# Patient Record
Sex: FEMALE | Race: WHITE | Employment: PART TIME
[De-identification: names, ages, dates, MRNs, and addresses within clinical notes are randomized per-mention and may not be internally consistent; named-entity substitution may affect disease eponyms.]

---

## 2017-01-03 ENCOUNTER — HISTORIC RESULTS (OUTPATIENT)
Dept: ADMINISTRATIVE | Age: 52
End: 2017-01-03

## 2017-01-04 ENCOUNTER — HISTORIC RESULTS (OUTPATIENT)
Dept: ADMINISTRATIVE | Age: 52
End: 2017-01-04

## 2017-03-01 ENCOUNTER — HISTORIC RESULTS (OUTPATIENT)
Dept: ADMINISTRATIVE | Age: 52
End: 2017-03-01

## 2017-05-11 ENCOUNTER — HISTORIC RESULTS (OUTPATIENT)
Dept: ADMINISTRATIVE | Age: 52
End: 2017-05-11

## 2017-11-12 ENCOUNTER — HEALTH MAINTENANCE LETTER (OUTPATIENT)
Age: 52
End: 2017-11-12

## 2018-06-11 ENCOUNTER — HOSPITAL ENCOUNTER (OUTPATIENT)
Dept: GENERAL RADIOLOGY | Facility: OTHER | Age: 53
Discharge: HOME | End: 2018-06-11
Attending: CHIROPRACTOR | Admitting: CHIROPRACTOR
Payer: COMMERCIAL

## 2018-06-11 DIAGNOSIS — M51.379 DISC DEGENERATION, LUMBOSACRAL: ICD-10-CM

## 2018-06-11 DIAGNOSIS — M19.90 ARTHRITIS: ICD-10-CM

## 2018-06-11 PROCEDURE — 72100 X-RAY EXAM L-S SPINE 2/3 VWS: CPT

## 2018-06-20 RX ORDER — IBUPROFEN 800 MG/1
800 TABLET, FILM COATED ORAL
Status: ON HOLD | COMMUNITY
Start: 2016-12-23 | End: 2020-08-28

## 2019-05-07 ENCOUNTER — HOSPITAL ENCOUNTER (OUTPATIENT)
Dept: ULTRASOUND IMAGING | Facility: OTHER | Age: 54
End: 2019-05-07
Attending: RADIOLOGY
Payer: COMMERCIAL

## 2019-05-07 DIAGNOSIS — I83.90 VARICOSE VEINS OF LOWER EXTREMITY: ICD-10-CM

## 2019-05-07 PROCEDURE — G0463 HOSPITAL OUTPT CLINIC VISIT: HCPCS

## 2020-03-02 ENCOUNTER — HEALTH MAINTENANCE LETTER (OUTPATIENT)
Age: 55
End: 2020-03-02

## 2020-06-16 ENCOUNTER — TRANSCRIBE ORDERS (OUTPATIENT)
Dept: OTHER | Age: 55
End: 2020-06-16

## 2020-06-16 DIAGNOSIS — K85.90 PANCREATITIS: Primary | ICD-10-CM

## 2020-06-17 ENCOUNTER — TELEPHONE (OUTPATIENT)
Dept: GASTROENTEROLOGY | Facility: CLINIC | Age: 55
End: 2020-06-17

## 2020-06-17 NOTE — TELEPHONE ENCOUNTER
[facility] Intake form:    Referring provider : Truong Troy   [facility]    Providers RNCC contact -   Name Camryn BOOKER  Phone [phone]   Fax number: [phone]    Requested provider (if specified):      Indication/Diagnosis for consultation: Pancreatis Recurrent    Is diagnosis on list of approved diagnosis: Yes    Has patient been evaluated in clinic or had a procedure [facility] provider in the last 5 years:  No     Has patient been evaluated by another Gastroenterologist? Unknown     Imaging completed:     CT scan     Yes    MRI         Yes    Procedures:     Upper Endoscopy/EGD Yes        Endoscopic Ultrasound/EUS No    ERCP  No    Colonoscopy No      Are images able/being pushed to our system? Yes     Is patient aware of request for clinc consultation and ok to be contacted to schedule? Yes

## 2020-06-18 NOTE — TELEPHONE ENCOUNTER
[facility]     Referring provider:  Truong Troy   [facility]    Referred to: [facility]     Provider Requested:  NA      Referral Received: 6/17/2020     Records received: in CareEverywhere     Images received:   Requested CT and MRI mail from [place]    Evaluation for:   Pancreatis Recurrent  w/ elevated LFTs    Clinical History (per RN review):       See Full note from Dr Troy in CE from 6/16/2020    Given recurrent symptoms of biliary colic despite having cholecystectomy, prior self-limiting elevation in liver enzymes and now acute pancreatitis which was also associated with elevation in liver enzymes, I think she needs further evaluation of her pancreaticobiliary system. She had a recent MRCP which was unremarkable.     I would like to send a referral to the pancreaticobiliary specialists at the [facility] for further evaluation.    She has a history of disaccharidase deficiency and malabsorption. History of diverticulitis. LINX surgery in 2016. Cholecystectomy    CT Abdomen 6/12/2020  IMPRESSION:  1. Development of mild fat stranding centered about the tail of the pancreas  and proximal portion of the descending colon.  Recommend correlation with  laboratory values.  This most likely represents early acute pancreatitis, less  likely diverticulitis.      Liver Enzymes elevated, per [facility]    6/12/2020  ALk Phos 147  AST 93  Lipase 488  ALT 80  CT 6/16/2020- read in [facility]  IMPRESSION:  1. Development of mild fat stranding centered about the tail of the pancreas  and proximal portion of the descending colon.  Recommend correlation with  laboratory values.  This most likely represents early acute pancreatitis, less  likely diverticulitis.    MRCP 4-- read in [facility]  Normal study      MD review date:   MD Decision for clinic consultation/Orders:            Referral updates/Patient contacted:

## 2020-06-23 ENCOUNTER — TELEPHONE (OUTPATIENT)
Dept: GASTROENTEROLOGY | Facility: CLINIC | Age: 55
End: 2020-06-23

## 2020-06-23 NOTE — TELEPHONE ENCOUNTER
Action 06/23/20 2:53 PM - Jojo   Action Taken  Imaging disc received from [place] and sent to [facility] to be uploaded into PACs.    6/12/20 - CT Abdomen Pelvis W  4/13/20 - MRI Abdomen RCP WO

## 2020-06-25 NOTE — TELEPHONE ENCOUNTER
Per Dr. Gamez    RESPOND candidate   Happy to see in clinic    Clinic scheduled 7/27    Denae Kingston, RN Care Coordinator

## 2020-07-21 ENCOUNTER — DOCUMENTATION ONLY (OUTPATIENT)
Dept: CARE COORDINATION | Facility: CLINIC | Age: 55
End: 2020-07-21

## 2020-07-27 ENCOUNTER — VIRTUAL VISIT (OUTPATIENT)
Dept: GASTROENTEROLOGY | Facility: CLINIC | Age: 55
End: 2020-07-27
Payer: COMMERCIAL

## 2020-07-27 VITALS — HEIGHT: 63 IN | BODY MASS INDEX: 28.35 KG/M2 | WEIGHT: 160 LBS

## 2020-07-27 DIAGNOSIS — K85.00 IDIOPATHIC ACUTE PANCREATITIS WITHOUT INFECTION OR NECROSIS: ICD-10-CM

## 2020-07-27 RX ORDER — ESTRADIOL 0.05 MG/D
1 PATCH TRANSDERMAL
COMMUNITY
Start: 2015-10-01

## 2020-07-27 ASSESSMENT — MIFFLIN-ST. JEOR: SCORE: 1299.89

## 2020-07-27 ASSESSMENT — PAIN SCALES - GENERAL: PAINLEVEL: EXTREME PAIN (9)

## 2020-07-27 NOTE — NURSING NOTE
"Chief Complaint   Patient presents with     Consult     Virtual consult       Vitals:    07/27/20 0940   Weight: 72.6 kg (160 lb)   Height: 1.6 m (5' 3\")       Body mass index is 28.34 kg/m .      PARISA SalazarT                      "

## 2020-07-27 NOTE — LETTER
7/27/2020         RE: Lesley Hyde  [id number]  [place] [address]        Dear Colleague,    Thank you for referring your patient, Lesley Hyde, to the [facility]. Please see a copy of my visit note below.    Lesley Hyde is a 55 year old female who is being evaluated via a billable video visit.      During this virtual visit the patient is located in [place], patient verifies this as the location during the entirety of this visit.     Video-Visit Details    Type of service:  Video Visit    Start: 07/27/2020 10:02 am   Stop: 07/27/2020 10:46 am    Originating Location (pt. Location): Home    Distant Location (provider location):  [facility]     Platform used for Video Visit: WegoWise     Lesley Merida is a very pleasant 55-year-old referred by Dr. Truong Troy for evaluation of unexplained pancreatitis and episodic liver chemistries post cholecystectomy in the background of complex functional abdominal pain.    Dr. Troy's excellent note is copied and pasted below, as is that of her primary nurse practitioner Kerri Echavarria    To summarize, we spent 40 minutes more than 50% counseling and a virtual video visit reviewing her history, laboratories and records through care everywhere.  We do not yet have images of her CT scan and MRCP uploaded but were able to review the reports.    Lesley Merida has a complex functional abdominal pain background as summarized below with idiopathic gastroparesis, reflux disease post Lynx procedure, diverticulitis, and fibromyalgia.  She is on no specific antidepressants as she has had very adverse reactions to those and in fact is really only on a transdermal estrogen patch.  She did undergo a cholecystectomy in 2017 for a decreased gallbladder ejection fraction but during that episode care everywhere documents significant rises in her liver chemistries as below to the right.  She also had slight elevation in her serum lipase at that time.   Third column from the left shows a recent episode managed as an outpatient with abdominal pain, a lipase of 488 with AST 80 ALT 93 alk phos 147 which then all normalized, MRCP interpreted as normal and CT showing fat stranding around the body and tail of the pancreas consistent with interstitial pancreatitis.  That episode was associated with upper left abdominal pain that was worse than her baseline functional pains    115 115 147 (H) 112   133 (H) 156 (H) 200 (H) 131 (H) 88               106 (H) 131 (H) 55 (H) 33   22 22 93 (H) 27   200 (H) 76 (H) 103 (H) 74 (H) 22                   51       113 (H) 488 (H)           116 (H) 95   21 21 80 (H) 22   109 (H)             Impression: 40 minutes as virtual visit discussing that she may have more than 1 pancreatitis episode although one is clearly documented.  She has had at least 2 episodes with significantly bumped liver enzymes that then normalized.  These findings are consistent with transient obstruction at the ampulla of Vater either due to papillary stenosis, biliary sludge, or perhaps benign ampullary neoplasm.  They are unlikely to represent cancer although her father presented with pancreatic cancer as acute pancreatitis.    We discussed that there is a reasonable chance that ERCP with biliary sphincterotomy, protective pancreatic stent and eventually possibly pancreatic sphincterotomy might relieve her pancreatitis episodes although the data are equivocal.  We did discuss enrolling in the NIH funded observational response study which is simply prospective documentation of outcomes of such intervention without affecting type work course of care.  The greater problem is in relieving her many sources of functional abdominal pain.  The good news is that she is very active working for part-time jobs and is not sedentary or on pain medications.  Did think that she might benefit from further attempts at medical management of her pain perhaps with amitriptyline if  that is not been tried but will leave that to her clearly excellent primary care.    For evaluation and treatment our recommendations are  #1 endoscopic ultrasound with Dr. Boston, combined with ERCP by myself pending results of Dr. Boston's endoscopic ultrasound.  Would plan on protective pancreatic stent and biliary sphincterotomy at a minimum unless there is findings of a periampullary neoplasm  #2 upload images of MRCP and CT scan prior to scheduling the above  #3 ask Lola our research coordinator to send the patient the protocol for the RESPOND study and confirm that we are authorized to restart enrolling patients since COVID-19    I would like to thank Dr. Troy for kindly referring this patient and we will make sure he is updated as to her progress  Progress Notes  - documented in this encounter  Truong Troy MD - 07/08/2020 11:00 AM CDT  GASTROENTEROLOGY PROGRESS NOTE  Date of Service: 4/8/2020    ASSESSMENT:  54-year-old female with complex past medical history who presents with multiple GI concerns.    #GERD  #History erosive reflux disease  Status post Linx in 2016 at outside facility. Reports she is doing well from reflux standpoint and postoperative Bravo studies show marked improvement in DeMeester score.    #Bloating  #Abdominal distention  #Severe idiopathic gastroparesis  #Fibromyalgia  #Abdominal discomfort  #History diverticulitis  She does report she is able to belch intermittently despite LINX. Suspect symptoms are multifactorial including functional component, severe gastroparesis, evacuation disorder and prior surgeries. Extensive evaluation noted below.    #Acute, recurrent epigastric/RUQ pain with nausea/vomiting  #Transient elevations in liver enzymes  #Recent acute, mild interstitial pancreatitis  #History of cholecystectomy  She has a history of symptoms which sound similar to what prompted her prior cholecystectomy. There is objective evidence during one of these episodes of a  rise in liver chemistries back in February. She also has documented complete normalization in liver enzymes in March when she was asymptomatic and then had objective evidence of pancreatitis and elevation in liver enzymes in June. Recent MRCP was normal and she is status post cholecystectomy. She does have a family history of pancreatitis and pancreatic cancer in her father. She uses minimal alcohol and does not smoke. No temporal relationship to any medications. TG and Ca normal. Nutrition markers have been checked and no evidence for pancreatic endocrine/exocrine dysfunction. Fecal elastase normal and has seen nutrition in the past for other GI issues but has not always been very consistent with low-fat diet low residue diet which was discussed for gastroparesis.     #Alternating diarrhea and constipation  #History of high-grade perineal tear-I do not have these records  #Diminished squeeze on anorectal physiology  #Pelvic organ prolapse and rectal intussusception  #Functional GI disorder  She did have pelvic floor testing in Feb 2020 with findings noted below. It was Dr. Torres's impression that most of her symptoms were functional. Nutrition markers all are appropriate and stool studies have been negative.    Patient very difficult to treat in that she has tried tricyclic antidepressants at least 2 prior occasions with adverse reactions. Antispasmodic agents often used for IBS work by relaxing smooth muscle and patient has diminished anal rectal squeeze pressures. While nutrition markers were not consistent with malabsorptive process such as SIBO we did a trial of rifaximin which is FDA approved for irritable bowel syndrome with diarrhea. This is also commonly used to treat intestinal bacterial overgrowth which she may be at risk for given her dysmotility component (B12/folate were notably normal).    RECOMMENDATIONS:  *Will try rifaximin 550 mg 3 times daily for 2 weeks for IBS. She has changed insurance so  this is affordable now. Also consider Reglan in the future for gastroparesis (though I believe she tried this previously).  *Avoid alcohol. She is a non-smoker  *Low-fat and low residue diet. Small, more frequent meals.   *She is scheduled to see pancreaticobiliary specialist Dr. Gamez at the [facility] at the end of the month. We will await his input  *Avoid NSAIDs    1. Total time spent 40 minutes. 50% or more in counseling or coordination of care.  2. The mode of transmission of the telemedicine service: telephone    RTC in 3 months. She will let me know what Dr. Gamez says in the interim.     Truong Troy MD  Gastroenterology    Note was created with voice recognition software and is subject to grammatical and spelling errors.       Progress Notes  - documented in this encounter  Kerri Echavarria, KENNY, CNP - 06/23/2020 9:30 AM CDT  Formatting of this note might be different [place] [address]  [phone] Fax: [phone]  [email or web address]    Nursing Notes:   DemiHoda 6/23/2020 9:39 AM Signed  Chief Complaint   Patient presents with     Follow Up     Patient was seen on 6/15/20 for abdominal pain follow up from urgent care visit on 6/12/20.     Plan from 6/15/20 ASSESSMENT/PLAN:  1. Follow up  Patient is here for follow up from 3 days ago in urgent. Patient was seen for left sided abdominal pain. Testing showed she has acute pancreatitis. She did try liquid diet for the first 2 days and tried to advance and ate fried rice and got nauseated and increased abdominal pain. Last night she tried mashed potatoes and had no issues. Discussed diet eat small low fat meal of carbohydrate and protiens, gradually increase quantity over 3-6 days as tolerated. Discussed that if she is not tolerating low residue diet that she should either go back to liquids or NPO for one day and try liquids and increasing diet  as tolerated.   No alcohol.     I feel that she is remaining stable at this time as she has not worsened and feels better after trying mashed potatoes. She has no fever. She is tolerating fluids. Still having stomach bloating and pain. She reports that she does not tolerate pain medications. Instructed that she may not get better for at least one week.     Follow up if symptoms worsen and would check lipase.     SUBJECTIVE:   Lesley Hyde is a 54 y.o. female who presents to the clinic for follow up on acute pancreatitis. She followed up with GI and had recent MRCP that was unremarkable. She is set up to see pancreaticobiliary specialist at the [facility] for further evaluation (ERCP). She has a history of disaccharidase deficiency and malabsorption. History of diverticulitis. LINX surgery in 2016. Cholecystectomy. Family history of pancreas cancer (father).     Today she reports she is feeling 70 % better. She is eating regular foods. Bowel movements are liquid and this morning soft. Appetite is better. Nauseated to stomach in the morning. Bloating is better.     CURRENT MEDICATIONS:  Outpatient Encounter Medications as of 6/23/2020   Medication Sig Dispense Refill     estradiol 0.05 mg/24 hr (CLIMARA) 0.05 mg/24 hr patch APPLY 1 PATCH ON DRY, CLEAN, HAIRLESS SKIN ONCE WEEKLY. 48 Patch 0     fluconazole (DIFLUCAN) 150 mg tablet TAKE 1 TABLET BY MOUTH EVERY 72 HOURS FOR 2 DOSES. 2 tablet 0     ibuprofen (ADVIL; MOTRIN) 800 mg tablet TAKE 1 TABLET BY MOUTH 3 TIMES DAILY IF NEEDED. 270 tablet 0     [DISCONTINUED] ondansetron (ZOFRAN ODT) 4 mg disintegrating tablet Place 1 tablet on the tongue every 8 hours if needed for Nausea/Vomiting. 8 tablet 0     No facility-administered encounter medications on file as of 6/23/2020.     There are no discontinued medications.    PROBLEM LIST:  Patient Active Problem List   Diagnosis Date Noted     Advance care planning 06/15/2020   Patient has identified Health Care Agent(s):  Yes  Add Health Care Agents: Yes   Health Care Agent(s):  Primary Health Care Agent:   Steve Hyde Relationship:   spouse Phone:   [phone]   Secondary Health Care Agent:   Martin Velasco Relationship:   Son Phone:   [phone]   Conservator: Relationship: Phone:   Guardian: Relationship: Phone:     Patient has Advance Care Plan Documents (Health Care Directive, POLST): Yes   Advance Care Plan Documents:  Health Care Directive    Patient has identified Specific Treatment Preferences: See HEALTHCARE DIRECTIVE for details        Fibromyalgia 10/10/2019     Post-operative state 05/16/2018   Added automatically from request for surgery [id number]      History of diverticulitis 05/11/2017   Added automatically from request for surgery [id number]      Esophageal pain 05/11/2017   Added automatically from request for surgery [id number]      Diverticulitis of large intestine without perforation or abscess without bleeding 03/30/2017     Chronic abdominal pain 01/18/2017     Chest pain, non-cardiac 01/18/2017     Preventative health care 10/07/2016   Colonoscopy:06/07/2017. Mammo:10/10/2016.  Immunizations: Td: 8/31/2011.       IBS (irritable bowel syndrome)     GERD (gastroesophageal reflux disease)     Intestinal disaccharidase deficiencies and disaccharide malabsorption     ALLERGIES:  Allergies   Allergen Reactions     Morphine Nausea Only     Bactrim [Sulfamethoxazole-Trimethoprim] Rash   Hives and blisters     Cymbalta [Duloxetine] Sedation     ROS:  General: Denies general constitutional problems  Cardiovascular: Denies problems  Respiratory: Denies problems  Gastrointestinal: see HPI  Genitourinary - female: Denies problems  Skin: Denies problems  Neurologic: Denies problems  Psychiatric: Denies problems  Endocrine: Denies problems  Heme/Lymphatic: Denies problems    OBJECTIVE:  /84 (Cuff Site: Right Arm, Position: Sitting, Cuff Size: Adult Regular)  Pulse 72  Temp 97.9  F (36.6  C) (Temporal)  Wt 73 kg (161 lb)   SpO2 96%  BMI 28.52 kg/m    GENERAL APPEARANCE: alert, in no acute distress.  CARDIOVASCULAR: regular rate and rhythm, S1 and S2 normal, no murmur.  RESPIRATORY: breathing non-labored, clear to auscultation bilaterally, no wheezes, rales, or rhonchi.  GASTROINTESTINAL: soft, bowel sounds present, no palpable masses, no palpable hepatosplenomegaly. Diffuse tenderness of abdomen but more on bilateral lower abdomen. Bloating better.   NEURO: orientated to person, place, and time.  PSYCH: pleasant affect.    ASSESSMENT/PLAN:  1. Acute pancreatitis without infection or necrosis, unspecified pancreatitis type  Patient is here to follow up on acute pancreatitis. She has a history of disaccharidase deficiency and malabsorption. History of diverticulitis. LINX surgery in 2016. Cholecystectomy. Family history of pancreas cancer (father). She followed up with GI in [place] and had recent MRCP that was unremarkable. She is set up to see pancreaticobiliary specialist at the [facility] for further evaluation (ERCP). She is feeling better. Bloating is better. Still has morning nausea. Bowel movement this morning was soft. Appetite has improved and has increased her diet. Experiencing diffuse abdominal pain but more on bilateral lower abdomen on exam.     - LIPASE; Future  - COMP METABOLIC PANEL; Future  - HEPATIC FUNCTION PANEL; Future    Follow through with advance diet as tolerated.   Pancreaticobiliary specialist at the [facility] as scheduled.     Will sent letter with lab.    SIGNED:  KENNY Temple, CNP .................... 6/23/2020 9:39 AM    For all of the above new medications, the patient received the following: the name of the drug, dose, route, frequency, purpose and the possible side effects. The patient also received appropriate follow up information. The patient acknowledged an understanding of the above. Upon discharge the patient was given an Rx education monograph.     We would like to thank you for  acknowledging an understanding of the above and for choosing [facility] for your healthcare needs.    Electronically signed by Kerri Echavarria, APRN, CNP at 06/23/2020 10:04 AM CDT      Nursing Notes  - documented in this encounter  Hoda Simms - 06/23/2020 9:30 AM CDT  Formatting of this note might be different from the original.  Chief Complaint   Patient presents with     Follow Up     Patient was seen on 6/15/20 for abdominal pain follow up from urgent care visit on 6/12/20.     Plan from 6/15/20 ASSESSMENT/PLAN:  1. Follow up  Patient is here for follow up from 3 days ago in urgent. Patient was seen for left sided abdominal pain. Testing showed she has acute pancreatitis. She did try liquid diet for the first 2 days and tried to advance and ate fried rice and got nauseated and increased abdominal pain. Last night she tried mashed potatoes and had no issues. Discussed diet eat small low fat meal of carbohydrate and protiens, gradually increase quantity over 3-6 days as tolerated. Discussed that if she is not tolerating low residue diet that she should either go back to liquids or NPO for one day and try liquids and increasing diet as tolerated.   No alcohol.     I feel that she is remaining stable at this time as she has not worsened and feels better after trying mashed potatoes. She has no fever. She is tolerating fluids. Still having stomach bloating and pain. She reports that she does not tolerate pain medications. Instructed that she may not get better for at least one week.     Follow up if symptoms worsen and would check lipase.     Electronically signed by Hoda Simms at 06/23/2020 9:39 AM SONIA aGmez MD

## 2020-07-27 NOTE — PROGRESS NOTES
"Lesley Hyde is a 55 year old female who is being evaluated via a billable video visit.      The patient has been notified of following:     \"This video visit will be conducted via a call between you and your physician/provider. We have found that certain health care needs can be provided without the need for an in-person physical exam.  This service lets us provide the care you need with a video conversation.  If a prescription is necessary we can send it directly to your pharmacy.  If lab work is needed we can place an order for that and you can then stop by our lab to have the test done at a later time.    Video visits are billed at different rates depending on your insurance coverage.  Please reach out to your insurance provider with any questions.    If during the course of the call the physician/provider feels a video visit is not appropriate, you will not be charged for this service.\"    Patient has given verbal consent for Video visit? Yes  How would you like to obtain your AVS? MyChart  If you are dropped from the video visit, the video invite should be resent to: Text to cell phone: [phone]  Will anyone else be joining your video visit? No      During this virtual visit the patient is located in [place], patient verifies this as the location during the entirety of this visit.     Video-Visit Details    Type of service:  Video Visit    Start: 07/27/2020 10:02 am   Stop: 07/27/2020 10:46 am    Originating Location (pt. Location): Home    Distant Location (provider location):  [facility]     Platform used for Video Visit: [facility]     Lesley Merida is a very pleasant 55-year-old referred by Dr. Truong Troy for evaluation of unexplained pancreatitis and episodic liver chemistries post cholecystectomy in the background of complex functional abdominal pain.    Dr. Troy's excellent note is copied and pasted below, as is that of her primary nurse practitioner Kerri Echavarria    To summarize, we spent " 40 minutes more than 50% counseling and a virtual video visit reviewing her history, laboratories and records through care everywhere.  We do not yet have images of her CT scan and MRCP uploaded but were able to review the reports.    Lesley Merida has a complex functional abdominal pain background as summarized below with idiopathic gastroparesis, reflux disease post Lynx procedure, diverticulitis, and fibromyalgia.  She is on no specific antidepressants as she has had very adverse reactions to those and in fact is really only on a transdermal estrogen patch.  She did undergo a cholecystectomy in 2017 for a decreased gallbladder ejection fraction but during that episode care everywhere documents significant rises in her liver chemistries as below to the right.  She also had slight elevation in her serum lipase at that time.  Third column from the left shows a recent episode managed as an outpatient with abdominal pain, a lipase of 488 with AST 80 ALT 93 alk phos 147 which then all normalized, MRCP interpreted as normal and CT showing fat stranding around the body and tail of the pancreas consistent with interstitial pancreatitis.  That episode was associated with upper left abdominal pain that was worse than her baseline functional pains    115 115 147 (H) 112   133 (H) 156 (H) 200 (H) 131 (H) 88               106 (H) 131 (H) 55 (H) 33   22 22 93 (H) 27   200 (H) 76 (H) 103 (H) 74 (H) 22                   51       113 (H) 488 (H)           116 (H) 95   21 21 80 (H) 22   109 (H)             Impression: 40 minutes as virtual visit discussing that she may have more than 1 pancreatitis episode although one is clearly documented.  She has had at least 2 episodes with significantly bumped liver enzymes that then normalized.  These findings are consistent with transient obstruction at the ampulla of Vater either due to papillary stenosis, biliary sludge, or perhaps benign ampullary neoplasm.  They are unlikely to represent  cancer although her father presented with pancreatic cancer as acute pancreatitis.    We discussed that there is a reasonable chance that ERCP with biliary sphincterotomy, protective pancreatic stent and eventually possibly pancreatic sphincterotomy might relieve her pancreatitis episodes although the data are equivocal.  We did discuss enrolling in the NIH funded observational response study which is simply prospective documentation of outcomes of such intervention without affecting type work course of care.  The greater problem is in relieving her many sources of functional abdominal pain.  The good news is that she is very active working for part-time jobs and is not sedentary or on pain medications.  Did think that she might benefit from further attempts at medical management of her pain perhaps with amitriptyline if that is not been tried but will leave that to her clearly excellent primary care.    For evaluation and treatment our recommendations are  #1 endoscopic ultrasound with Dr. Boston, combined with ERCP by myself pending results of Dr. Boston's endoscopic ultrasound.  Would plan on protective pancreatic stent and biliary sphincterotomy at a minimum unless there is findings of a periampullary neoplasm  #2 upload images of MRCP and CT scan prior to scheduling the above  #3 ask Lola our research coordinator to send the patient the protocol for the RESPOND study and confirm that we are authorized to restart enrolling patients since COVID-19    I would like to thank Dr. Troy for kindly referring this patient and we will make sure he is updated as to her progress  Progress Notes  - documented in this encounter  Truong Troy MD - 07/08/2020 11:00 AM CDT  GASTROENTEROLOGY PROGRESS NOTE  Date of Service: 4/8/2020    ASSESSMENT:  54-year-old female with complex past medical history who presents with multiple GI concerns.    #GERD  #History erosive reflux disease  Status post Linx in 2016 at outside  facility. Reports she is doing well from reflux standpoint and postoperative Bravo studies show marked improvement in DeMeester score.    #Bloating  #Abdominal distention  #Severe idiopathic gastroparesis  #Fibromyalgia  #Abdominal discomfort  #History diverticulitis  She does report she is able to belch intermittently despite LINX. Suspect symptoms are multifactorial including functional component, severe gastroparesis, evacuation disorder and prior surgeries. Extensive evaluation noted below.    #Acute, recurrent epigastric/RUQ pain with nausea/vomiting  #Transient elevations in liver enzymes  #Recent acute, mild interstitial pancreatitis  #History of cholecystectomy  She has a history of symptoms which sound similar to what prompted her prior cholecystectomy. There is objective evidence during one of these episodes of a rise in liver chemistries back in February. She also has documented complete normalization in liver enzymes in March when she was asymptomatic and then had objective evidence of pancreatitis and elevation in liver enzymes in June. Recent MRCP was normal and she is status post cholecystectomy. She does have a family history of pancreatitis and pancreatic cancer in her father. She uses minimal alcohol and does not smoke. No temporal relationship to any medications. TG and Ca normal. Nutrition markers have been checked and no evidence for pancreatic endocrine/exocrine dysfunction. Fecal elastase normal and has seen nutrition in the past for other GI issues but has not always been very consistent with low-fat diet low residue diet which was discussed for gastroparesis.     #Alternating diarrhea and constipation  #History of high-grade perineal tear-I do not have these records  #Diminished squeeze on anorectal physiology  #Pelvic organ prolapse and rectal intussusception  #Functional GI disorder  She did have pelvic floor testing in Feb 2020 with findings noted below. It was Dr. Torres's impression  that most of her symptoms were functional. Nutrition markers all are appropriate and stool studies have been negative.    Patient very difficult to treat in that she has tried tricyclic antidepressants at least 2 prior occasions with adverse reactions. Antispasmodic agents often used for IBS work by relaxing smooth muscle and patient has diminished anal rectal squeeze pressures. While nutrition markers were not consistent with malabsorptive process such as SIBO we did a trial of rifaximin which is FDA approved for irritable bowel syndrome with diarrhea. This is also commonly used to treat intestinal bacterial overgrowth which she may be at risk for given her dysmotility component (B12/folate were notably normal).    RECOMMENDATIONS:  *Will try rifaximin 550 mg 3 times daily for 2 weeks for IBS. She has changed insurance so this is affordable now. Also consider Reglan in the future for gastroparesis (though I believe she tried this previously).  *Avoid alcohol. She is a non-smoker  *Low-fat and low residue diet. Small, more frequent meals.   *She is scheduled to see pancreaticobiliary specialist Dr. Gamez at the [facility] at the end of the month. We will await his input  *Avoid NSAIDs    1. Total time spent 40 minutes. 50% or more in counseling or coordination of care.  2. The mode of transmission of the telemedicine service: telephone    RTC in 3 months. She will let me know what Dr. Gamez says in the interim.     Truong Troy MD  Gastroenterology    Note was created with voice recognition software and is subject to grammatical and spelling errors.       Progress Notes  - documented in this encounter  Kerri Echavarria, APRN, CNP - 06/23/2020 9:30 AM CDT  Formatting of this note might be different from the original[place] [address]  [phone] Fax: [phone]  [email or web address]    Nursing Notes:   Hoda Simms 6/23/2020  9:39 AM Signed  Chief Complaint   Patient presents with     Follow Up     Patient was seen on 6/15/20 for abdominal pain follow up from urgent care visit on 6/12/20.     Plan from 6/15/20 ASSESSMENT/PLAN:  1. Follow up  Patient is here for follow up from 3 days ago in urgent. Patient was seen for left sided abdominal pain. Testing showed she has acute pancreatitis. She did try liquid diet for the first 2 days and tried to advance and ate fried rice and got nauseated and increased abdominal pain. Last night she tried mashed potatoes and had no issues. Discussed diet eat small low fat meal of carbohydrate and protiens, gradually increase quantity over 3-6 days as tolerated. Discussed that if she is not tolerating low residue diet that she should either go back to liquids or NPO for one day and try liquids and increasing diet as tolerated.   No alcohol.     I feel that she is remaining stable at this time as she has not worsened and feels better after trying mashed potatoes. She has no fever. She is tolerating fluids. Still having stomach bloating and pain. She reports that she does not tolerate pain medications. Instructed that she may not get better for at least one week.     Follow up if symptoms worsen and would check lipase.     SUBJECTIVE:   Lesley Hyde is a 54 y.o. female who presents to the clinic for follow up on acute pancreatitis. She followed up with GI and had recent MRCP that was unremarkable. She is set up to see pancreaticobiliary specialist [place] for further evaluation (ERCP). She has a history of disaccharidase deficiency and malabsorption. History of diverticulitis. LINX surgery in 2016. Cholecystectomy. Family history of pancreas cancer (father).     Today she reports she is feeling 70 % better. She is eating regular foods. Bowel movements are liquid and this morning soft. Appetite is better. Nauseated to stomach in the morning. Bloating is better.     CURRENT MEDICATIONS:  Outpatient  Encounter Medications as of 6/23/2020   Medication Sig Dispense Refill     estradiol 0.05 mg/24 hr (CLIMARA) 0.05 mg/24 hr patch APPLY 1 PATCH ON DRY, CLEAN, HAIRLESS SKIN ONCE WEEKLY. 48 Patch 0     fluconazole (DIFLUCAN) 150 mg tablet TAKE 1 TABLET BY MOUTH EVERY 72 HOURS FOR 2 DOSES. 2 tablet 0     ibuprofen (ADVIL; MOTRIN) 800 mg tablet TAKE 1 TABLET BY MOUTH 3 TIMES DAILY IF NEEDED. 270 tablet 0     [DISCONTINUED] ondansetron (ZOFRAN ODT) 4 mg disintegrating tablet Place 1 tablet on the tongue every 8 hours if needed for Nausea/Vomiting. 8 tablet 0     No facility-administered encounter medications on file as of 6/23/2020.     There are no discontinued medications.    PROBLEM LIST:  Patient Active Problem List   Diagnosis Date Noted     Advance care planning 06/15/2020   Patient has identified Health Care Agent(s): Yes  Add Health Care Agents: Yes   Health Care Agent(s):  Primary Health Care Agent:   Steve Hyde Relationship:   spouse Phone:   [phone]   Secondary Health Care Agent:   Martin Velasco Relationship:   Son Phone:   [phone]   Conservator: Relationship: Phone:   Guardian: Relationship: Phone:     Patient has Advance Care Plan Documents (Health Care Directive, POLST): Yes   Advance Care Plan Documents:  Health Care Directive    Patient has identified Specific Treatment Preferences: See HEALTHCARE DIRECTIVE for details        Fibromyalgia 10/10/2019     Post-operative state 05/16/2018   Added automatically from request for surgery [id number]      History of diverticulitis 05/11/2017   Added automatically from request for surgery [id number]      Esophageal pain 05/11/2017   Added automatically from request for surgery [id number]      Diverticulitis of large intestine without perforation or abscess without bleeding 03/30/2017     Chronic abdominal pain 01/18/2017     Chest pain, non-cardiac 01/18/2017     Preventative health care 10/07/2016   Colonoscopy:06/07/2017. Mammo:10/10/2016.  Immunizations: Td:  8/31/2011.       IBS (irritable bowel syndrome)     GERD (gastroesophageal reflux disease)     Intestinal disaccharidase deficiencies and disaccharide malabsorption     ALLERGIES:  Allergies   Allergen Reactions     Morphine Nausea Only     Bactrim [Sulfamethoxazole-Trimethoprim] Rash   Hives and blisters     Cymbalta [Duloxetine] Sedation     ROS:  General: Denies general constitutional problems  Cardiovascular: Denies problems  Respiratory: Denies problems  Gastrointestinal: see HPI  Genitourinary - female: Denies problems  Skin: Denies problems  Neurologic: Denies problems  Psychiatric: Denies problems  Endocrine: Denies problems  Heme/Lymphatic: Denies problems    OBJECTIVE:  /84 (Cuff Site: Right Arm, Position: Sitting, Cuff Size: Adult Regular)  Pulse 72  Temp 97.9  F (36.6  C) (Temporal)  Wt 73 kg (161 lb)  SpO2 96%  BMI 28.52 kg/m    GENERAL APPEARANCE: alert, in no acute distress.  CARDIOVASCULAR: regular rate and rhythm, S1 and S2 normal, no murmur.  RESPIRATORY: breathing non-labored, clear to auscultation bilaterally, no wheezes, rales, or rhonchi.  GASTROINTESTINAL: soft, bowel sounds present, no palpable masses, no palpable hepatosplenomegaly. Diffuse tenderness of abdomen but more on bilateral lower abdomen. Bloating better.   NEURO: orientated to person, place, and time.  PSYCH: pleasant affect.    ASSESSMENT/PLAN:  1. Acute pancreatitis without infection or necrosis, unspecified pancreatitis type  Patient is here to follow up on acute pancreatitis. She has a history of disaccharidase deficiency and malabsorption. History of diverticulitis. LINX surgery in 2016. Cholecystectomy. Family history of pancreas cancer (father). She followed up with GI in [place] and had recent MRCP that was unremarkable. She is set up to see pancreaticobiliary specialist at the [facility] for further evaluation (ERCP). She is feeling better. Bloating is better. Still has morning nausea. Bowel movement this morning  was soft. Appetite has improved and has increased her diet. Experiencing diffuse abdominal pain but more on bilateral lower abdomen on exam.     - LIPASE; Future  - COMP METABOLIC PANEL; Future  - HEPATIC FUNCTION PANEL; Future    Follow through with advance diet as tolerated.   P[facility]obiliary specialist at the [facility] as scheduled.     Will sent letter with lab.    SIGNED:  KENNY Temple, CNP .................... 6/23/2020 9:39 AM    For all of the above new medications, the patient received the following: the name of the drug, dose, route, frequency, purpose and the possible side effects. The patient also received appropriate follow up information. The patient acknowledged an understanding of the above. Upon discharge the patient was given an Rx education monograph.     We would like to thank you for acknowledging an understanding of the above and for choosing [facility] for your healthcare needs.    Electronically signed by Kerri Echavarria APRN, CNP at 06/23/2020 10:04 AM CDT      Nursing Notes  - documented in this encounter  Hoda Simms - 06/23/2020 9:30 AM CDT  Formatting of this note might be different from the original.  Chief Complaint   Patient presents with     Follow Up     Patient was seen on 6/15/20 for abdominal pain follow up from urgent care visit on 6/12/20.     Plan from 6/15/20 ASSESSMENT/PLAN:  1. Follow up  Patient is here for follow up from 3 days ago in urgent. Patient was seen for left sided abdominal pain. Testing showed she has acute pancreatitis. She did try liquid diet for the first 2 days and tried to advance and ate fried rice and got nauseated and increased abdominal pain. Last night she tried mashed potatoes and had no issues. Discussed diet eat small low fat meal of carbohydrate and protiens, gradually increase quantity over 3-6 days as tolerated. Discussed that if she is not tolerating low residue diet that she should either go back to liquids  or NPO for one day and try liquids and increasing diet as tolerated.   No alcohol.     I feel that she is remaining stable at this time as she has not worsened and feels better after trying mashed potatoes. She has no fever. She is tolerating fluids. Still having stomach bloating and pain. She reports that she does not tolerate pain medications. Instructed that she may not get better for at least one week.     Follow up if symptoms worsen and would check lipase.     Electronically signed by Hoda Simms at 06/23/2020 9:39 AM SONIA Gamez MD

## 2020-07-27 NOTE — LETTER
"    7/27/2020         RE: Lesley Hyde  [address] [facility] [address]      Lesley Hyde is a 55 year old female who is being evaluated via a billable video visit.      The patient has been notified of following:     \"This video visit will be conducted via a call between you and your physician/provider. We have found that certain health care needs can be provided without the need for an in-person physical exam.  This service lets us provide the care you need with a video conversation.  If a prescription is necessary we can send it directly to your pharmacy.  If lab work is needed we can place an order for that and you can then stop by our lab to have the test done at a later time.    Video visits are billed at different rates depending on your insurance coverage.  Please reach out to your insurance provider with any questions.    If during the course of the call the physician/provider feels a video visit is not appropriate, you will not be charged for this service.\"    Patient has given verbal consent for Video visit? Yes  How would you like to obtain your AVS? MyChart  If you are dropped from the video visit, the video invite should be resent to: Text to cell phone: [phone]  Will anyone else be joining your video visit? No      During this virtual visit the patient is located in [place], patient verifies this as the location during the entirety of this visit.     Video-Visit Details    Type of service:  Video Visit    Start: 07/27/2020 10:02 am   Stop: 07/27/2020 10:46 am    Originating Location (pt. Location): Home    Distant Location (provider location):  [facility]     Platform used for Video Visit: Isaías     Lesley Merida is a very pleasant 55-year-old referred by Dr. Truong Troy for evaluation of unexplained pancreatitis and episodic liver chemistries post cholecystectomy in the background of complex functional abdominal pain.    Dr. Troy's excellent note is copied and pasted below, " as is that of her primary nurse practitioner Kerri Echavarria    To summarize, we spent 40 minutes more than 50% counseling and a virtual video visit reviewing her history, laboratories and records through care everywhere.  We do not yet have images of her CT scan and MRCP uploaded but were able to review the reports.    Lesley Merida has a complex functional abdominal pain background as summarized below with idiopathic gastroparesis, reflux disease post Lynx procedure, diverticulitis, and fibromyalgia.  She is on no specific antidepressants as she has had very adverse reactions to those and in fact is really only on a transdermal estrogen patch.  She did undergo a cholecystectomy in 2017 for a decreased gallbladder ejection fraction but during that episode care everywhere documents significant rises in her liver chemistries as below to the right.  She also had slight elevation in her serum lipase at that time.  Third column from the left shows a recent episode managed as an outpatient with abdominal pain, a lipase of 488 with AST 80 ALT 93 alk phos 147 which then all normalized, MRCP interpreted as normal and CT showing fat stranding around the body and tail of the pancreas consistent with interstitial pancreatitis.  That episode was associated with upper left abdominal pain that was worse than her baseline functional pains    115 115 147 (H) 112   133 (H) 156 (H) 200 (H) 131 (H) 88               106 (H) 131 (H) 55 (H) 33   22 22 93 (H) 27   200 (H) 76 (H) 103 (H) 74 (H) 22                   51       113 (H) 488 (H)           116 (H) 95   21 21 80 (H) 22   109 (H)             Impression: 40 minutes as virtual visit discussing that she may have more than 1 pancreatitis episode although one is clearly documented.  She has had at least 2 episodes with significantly bumped liver enzymes that then normalized.  These findings are consistent with transient obstruction at the ampulla of Vater either due to papillary stenosis,  biliary sludge, or perhaps benign ampullary neoplasm.  They are unlikely to represent cancer although her father presented with pancreatic cancer as acute pancreatitis.    We discussed that there is a reasonable chance that ERCP with biliary sphincterotomy, protective pancreatic stent and eventually possibly pancreatic sphincterotomy might relieve her pancreatitis episodes although the data are equivocal.  We did discuss enrolling in the [facility] funded observational response study which is simply prospective documentation of outcomes of such intervention without affecting type work course of care.  The greater problem is in relieving her many sources of functional abdominal pain.  The good news is that she is very active working for part-time jobs and is not sedentary or on pain medications.  Did think that she might benefit from further attempts at medical management of her pain perhaps with amitriptyline if that is not been tried but will leave that to her clearly excellent primary care.    For evaluation and treatment our recommendations are  #1 endoscopic ultrasound with Dr. Boston, combined with ERCP by myself pending results of Dr. Boston's endoscopic ultrasound.  Would plan on protective pancreatic stent and biliary sphincterotomy at a minimum unless there is findings of a periampullary neoplasm  #2 upload images of MRCP and CT scan prior to scheduling the above  #3 ask Lola our research coordinator to send the patient the protocol for the RESPOND study and confirm that we are authorized to restart enrolling patients since COVID-19    I would like to thank Dr. Troy for kindly referring this patient and we will make sure he is updated as to her progress  Progress Notes  - documented in this encounter  Truong Troy MD - 07/08/2020 11:00 AM CDT  GASTROENTEROLOGY PROGRESS NOTE  Date of Service: 4/8/2020    ASSESSMENT:  54-year-old female with complex past medical history who presents with multiple GI  concerns.    #GERD  #History erosive reflux disease  Status post Linx in 2016 at outside facility. Reports she is doing well from reflux standpoint and postoperative Bravo studies show marked improvement in DeMeester score.    #Bloating  #Abdominal distention  #Severe idiopathic gastroparesis  #Fibromyalgia  #Abdominal discomfort  #History diverticulitis  She does report she is able to belch intermittently despite LINX. Suspect symptoms are multifactorial including functional component, severe gastroparesis, evacuation disorder and prior surgeries. Extensive evaluation noted below.    #Acute, recurrent epigastric/RUQ pain with nausea/vomiting  #Transient elevations in liver enzymes  #Recent acute, mild interstitial pancreatitis  #History of cholecystectomy  She has a history of symptoms which sound similar to what prompted her prior cholecystectomy. There is objective evidence during one of these episodes of a rise in liver chemistries back in February. She also has documented complete normalization in liver enzymes in March when she was asymptomatic and then had objective evidence of pancreatitis and elevation in liver enzymes in June. Recent MRCP was normal and she is status post cholecystectomy. She does have a family history of pancreatitis and pancreatic cancer in her father. She uses minimal alcohol and does not smoke. No temporal relationship to any medications. TG and Ca normal. Nutrition markers have been checked and no evidence for pancreatic endocrine/exocrine dysfunction. Fecal elastase normal and has seen nutrition in the past for other GI issues but has not always been very consistent with low-fat diet low residue diet which was discussed for gastroparesis.     #Alternating diarrhea and constipation  #History of high-grade perineal tear-I do not have these records  #Diminished squeeze on anorectal physiology  #Pelvic organ prolapse and rectal intussusception  #Functional GI disorder  She did have  pelvic floor testing in Feb 2020 with findings noted below. It was Dr. Torres's impression that most of her symptoms were functional. Nutrition markers all are appropriate and stool studies have been negative.    Patient very difficult to treat in that she has tried tricyclic antidepressants at least 2 prior occasions with adverse reactions. Antispasmodic agents often used for IBS work by relaxing smooth muscle and patient has diminished anal rectal squeeze pressures. While nutrition markers were not consistent with malabsorptive process such as SIBO we did a trial of rifaximin which is FDA approved for irritable bowel syndrome with diarrhea. This is also commonly used to treat intestinal bacterial overgrowth which she may be at risk for given her dysmotility component (B12/folate were notably normal).    RECOMMENDATIONS:  *Will try rifaximin 550 mg 3 times daily for 2 weeks for IBS. She has changed insurance so this is affordable now. Also consider Reglan in the future for gastroparesis (though I believe she tried this previously).  *Avoid alcohol. She is a non-smoker  *Low-fat and low residue diet. Small, more frequent meals.   *She is scheduled to see pancreaticobiliary specialist Dr. Gamez at the [facility] at the end of the month. We will await his input  *Avoid NSAIDs    1. Total time spent 40 minutes. 50% or more in counseling or coordination of care.  2. The mode of transmission of the telemedicine service: telephone    RTC in 3 months. She will let me know what Dr. Gamez says in the interim.     Truong Troy MD  Gastroenterology    Note was created with voice recognition software and is subject to grammatical and spelling errors.       Progress Notes  - documented in this encounter  Kerri Echavarria, APRN, CNP - 06/23/2020 9:30 AM CDT  Formatting of this note might be different from the original.  [place] [id number] [phone]  Fax: [phone]  [email or web address]    Nursing Notes:   Hoda Simms 6/23/2020 9:39 AM Signed  Chief Complaint   Patient presents with     Follow Up     Patient was seen on 6/15/20 for abdominal pain follow up from urgent care visit on 6/12/20.     Plan from 6/15/20 ASSESSMENT/PLAN:  1. Follow up  Patient is here for follow up from 3 days ago in urgent. Patient was seen for left sided abdominal pain. Testing showed she has acute pancreatitis. She did try liquid diet for the first 2 days and tried to advance and ate fried rice and got nauseated and increased abdominal pain. Last night she tried mashed potatoes and had no issues. Discussed diet eat small low fat meal of carbohydrate and protiens, gradually increase quantity over 3-6 days as tolerated. Discussed that if she is not tolerating low residue diet that she should either go back to liquids or NPO for one day and try liquids and increasing diet as tolerated.   No alcohol.     I feel that she is remaining stable at this time as she has not worsened and feels better after trying mashed potatoes. She has no fever. She is tolerating fluids. Still having stomach bloating and pain. She reports that she does not tolerate pain medications. Instructed that she may not get better for at least one week.     Follow up if symptoms worsen and would check lipase.     SUBJECTIVE:   Lesley Hyde is a 54 y.o. female who presents to the clinic for follow up on acute pancreatitis. She followed up with GI and had recent MRCP that was unremarkable. She is set up to see pancreaticobiliary specialist at the [facility] for further evaluation (ERCP). She has a history of disaccharidase deficiency and malabsorption. History of diverticulitis. LINX surgery in 2016. Cholecystectomy. Family history of pancreas cancer (father).     Today she reports she is feeling 70 % better. She is eating regular foods. Bowel movements are liquid and this morning soft. Appetite is better.  Nauseated to stomach in the morning. Bloating is better.     CURRENT MEDICATIONS:  Outpatient Encounter Medications as of 6/23/2020   Medication Sig Dispense Refill     estradiol 0.05 mg/24 hr (CLIMARA) 0.05 mg/24 hr patch APPLY 1 PATCH ON DRY, CLEAN, HAIRLESS SKIN ONCE WEEKLY. 48 Patch 0     fluconazole (DIFLUCAN) 150 mg tablet TAKE 1 TABLET BY MOUTH EVERY 72 HOURS FOR 2 DOSES. 2 tablet 0     ibuprofen (ADVIL; MOTRIN) 800 mg tablet TAKE 1 TABLET BY MOUTH 3 TIMES DAILY IF NEEDED. 270 tablet 0     [DISCONTINUED] ondansetron (ZOFRAN ODT) 4 mg disintegrating tablet Place 1 tablet on the tongue every 8 hours if needed for Nausea/Vomiting. 8 tablet 0     No facility-administered encounter medications on file as of 6/23/2020.     There are no discontinued medications.    PROBLEM LIST:  Patient Active Problem List   Diagnosis Date Noted     Advance care planning 06/15/2020   Patient has identified Health Care Agent(s): Yes  Add Health Care Agents: Yes   Health Care Agent(s):  Primary Health Care Agent:   Steve Hyde Relationship:   spouse Phone:   [phone]   Secondary Health Care Agent:   Martin Rod Relationship:   Son Phone:   [phone]   Conservator: Relationship: Phone:   Guardian: Relationship: Phone:     Patient has Advance Care Plan Documents (Health Care Directive, POLST): Yes   Advance Care Plan Documents:  Health Care Directive    Patient has identified Specific Treatment Preferences: See HEALTHCARE DIRECTIVE for details        Fibromyalgia 10/10/2019     Post-operative state 05/16/2018   Added automatically from request for surgery [id number]      History of diverticulitis 05/11/2017   Added automatically from request for surgery [id number]      Esophageal pain 05/11/2017   Added automatically from request for surgery [id number]      Diverticulitis of large intestine without perforation or abscess without bleeding 03/30/2017     Chronic abdominal pain 01/18/2017     Chest pain, non-cardiac 01/18/2017      [facility] health care 10/07/2016   Colonoscopy:06/07/2017. Mammo:10/10/2016.  Immunizations: Td: 8/31/2011.       IBS (irritable bowel syndrome)     GERD (gastroesophageal reflux disease)     Intestinal disaccharidase deficiencies and disaccharide malabsorption     ALLERGIES:  Allergies   Allergen Reactions     Morphine Nausea Only     Bactrim [Sulfamethoxazole-Trimethoprim] Rash   Hives and blisters     Cymbalta [Duloxetine] Sedation     ROS:  General: Denies general constitutional problems  Cardiovascular: Denies problems  Respiratory: Denies problems  Gastrointestinal: see HPI  Genitourinary - female: Denies problems  Skin: Denies problems  Neurologic: Denies problems  Psychiatric: Denies problems  Endocrine: Denies problems  Heme/Lymphatic: Denies problems    OBJECTIVE:  /84 (Cuff Site: Right Arm, Position: Sitting, Cuff Size: Adult Regular)  Pulse 72  Temp 97.9  F (36.6  C) (Temporal)  Wt 73 kg (161 lb)  SpO2 96%  BMI 28.52 kg/m    GENERAL APPEARANCE: alert, in no acute distress.  CARDIOVASCULAR: regular rate and rhythm, S1 and S2 normal, no murmur.  RESPIRATORY: breathing non-labored, clear to auscultation bilaterally, no wheezes, rales, or rhonchi.  GASTROINTESTINAL: soft, bowel sounds present, no palpable masses, no palpable hepatosplenomegaly. Diffuse tenderness of abdomen but more on bilateral lower abdomen. Bloating better.   NEURO: orientated to person, place, and time.  PSYCH: pleasant affect.    ASSESSMENT/PLAN:  1. Acute pancreatitis without infection or necrosis, unspecified pancreatitis type  Patient is here to follow up on acute pancreatitis. She has a history of disaccharidase deficiency and malabsorption. History of diverticulitis. LINX surgery in 2016. Cholecystectomy. Family history of pancreas cancer (father). She followed up with GI in [place] and had recent MRCP that was unremarkable. She is set up to see pancreaticobiliary specialist at the [facility] for further evaluation (ERCP).  She is feeling better. Bloating is better. Still has morning nausea. Bowel movement this morning was soft. Appetite has improved and has increased her diet. Experiencing diffuse abdominal pain but more on bilateral lower abdomen on exam.     - LIPASE; Future  - COMP METABOLIC PANEL; Future  - HEPATIC FUNCTION PANEL; Future    Follow through with advance diet as tolerated.   Pancreaticobiliary specialist at the [facility] as scheduled.     Will sent letter with lab.    SIGNED:  KENNY Temple, CNP .................... 6/23/2020 9:39 AM    For all of the above new medications, the patient received the following: the name of the drug, dose, route, frequency, purpose and the possible side effects. The patient also received appropriate follow up information. The patient acknowledged an understanding of the above. Upon discharge the patient was given an Rx education monograph.     We would like to thank you for acknowledging an understanding of the above and for choosing [facility] for your healthcare needs.    Electronically signed by Kerri Echavarria, KENNY, CNP at 06/23/2020 10:04 AM CDT      Nursing Notes  - documented in this encounter  Hoda Simms - 06/23/2020 9:30 AM CDT  Formatting of this note might be different from the original.  Chief Complaint   Patient presents with     Follow Up     Patient was seen on 6/15/20 for abdominal pain follow up from urgent care visit on 6/12/20.     Plan from 6/15/20 ASSESSMENT/PLAN:  1. Follow up  Patient is here for follow up from 3 days ago in urgent. Patient was seen for left sided abdominal pain. Testing showed she has acute pancreatitis. She did try liquid diet for the first 2 days and tried to advance and ate fried rice and got nauseated and increased abdominal pain. Last night she tried mashed potatoes and had no issues. Discussed diet eat small low fat meal of carbohydrate and protiens, gradually increase quantity over 3-6 days as tolerated.  Discussed that if she is not tolerating low residue diet that she should either go back to liquids or NPO for one day and try liquids and increasing diet as tolerated.   No alcohol.     I feel that she is remaining stable at this time as she has not worsened and feels better after trying mashed potatoes. She has no fever. She is tolerating fluids. Still having stomach bloating and pain. She reports that she does not tolerate pain medications. Instructed that she may not get better for at least one week.     Follow up if symptoms worsen and would check lipase.     Electronically signed by Hoda Simms at 06/23/2020 9:39 AM MD Ramesh Lynn MD

## 2020-07-28 ENCOUNTER — PREP FOR PROCEDURE (OUTPATIENT)
Dept: GASTROENTEROLOGY | Facility: CLINIC | Age: 55
End: 2020-07-28

## 2020-07-28 DIAGNOSIS — K85.00 IDIOPATHIC ACUTE PANCREATITIS: Primary | ICD-10-CM

## 2020-07-28 NOTE — PATIENT INSTRUCTIONS
Follow up:    Dr. Gamez has outlined the following steps after your recent clinic visit:    For evaluation and treatment our recommendations are    #1 endoscopic ultrasound with Dr. Boston, combined with ERCP by myself pending results of Dr. Boston's endoscopic ultrasound.  We will contact you to schedule a date. You will need a preop physical 30  Days prior to procedure and a COVID test 3-4 days prior to the procedure.    #2 upload images of MRCP and CT scan prior to scheduling the above- we called to request copies of images from earlier this year    #3 ask Lola our research coordinator to send the patient the protocol for the RESPOND study and confirm that we are authorized to restart enrolling patients since COVID-19      Please call with any questions or concerns regarding your clinic visit today.    It is a pleasure being involved in your health care.    Contacts post-consultation depending on your need:    Schedule Clinic Appointments            [phone] # 1   M-F 7:30 - 5 pm    Denae Kingston RN Care Coordinator  [phone]     OR Procedure Scheduling                             [phone]    My Chart is available 24 hours a day and is a secure way to access your records and communicate with your care team.  I strongly recommend signing up if you haven't already done so, if you are comfortable with computers.  If you would like to inquire about this or are having problems with My Chart access, you may call [phone] or go online at [email or web address].  Please allow at least 24 hours for a response and extra time on weekends and Holidays.

## 2020-07-31 ENCOUNTER — TELEPHONE (OUTPATIENT)
Dept: GASTROENTEROLOGY | Facility: CLINIC | Age: 55
End: 2020-07-31

## 2020-07-31 NOTE — TELEPHONE ENCOUNTER
Action 07/31/20 2:40 PM - Jojo   Action Taken  Imaging disc received from [place] and sent to [facility] to be uploaded into PACs.     6/12/20 - CT Abdomen Pelvis W  4/13/20 - MRI Abdomen MRCP WO

## 2020-08-04 ENCOUNTER — TELEPHONE (OUTPATIENT)
Dept: GASTROENTEROLOGY | Facility: CLINIC | Age: 55
End: 2020-08-04

## 2020-08-04 DIAGNOSIS — Z11.59 ENCOUNTER FOR SCREENING FOR OTHER VIRAL DISEASES: Primary | ICD-10-CM

## 2020-08-04 PROBLEM — K85.00 IDIOPATHIC ACUTE PANCREATITIS: Status: ACTIVE | Noted: 2020-08-04

## 2020-08-04 NOTE — TELEPHONE ENCOUNTER
LVM for patient in regards to scheduled procedure with Dr. Gamez/ Dr. Boston on 8/27/2020. Left direct line for patient to call to go over scheduling details.

## 2020-08-07 ENCOUNTER — PATIENT OUTREACH (OUTPATIENT)
Dept: GASTROENTEROLOGY | Facility: CLINIC | Age: 55
End: 2020-08-07

## 2020-08-07 NOTE — TELEPHONE ENCOUNTER
Returned patients call related to need for preop. States that she has preop scheduled. Is working to get covid test done, discussed location. Will try [place].    Answered questions about procedure.     Denae Kingston RN Care Coordinator

## 2020-08-07 NOTE — TELEPHONE ENCOUNTER
Called to obtain prior imaging      [address] [place] [address]    [phone]       States they mailed in on 7/28, not loaded yet.    Denae Kingston RN Care Coordinator

## 2020-08-18 ENCOUNTER — PATIENT OUTREACH (OUTPATIENT)
Dept: GASTROENTEROLOGY | Facility: CLINIC | Age: 55
End: 2020-08-18

## 2020-08-18 NOTE — TELEPHONE ENCOUNTER
Still do not have images as requested, although they were mailed on 7/28    [facility]    [address] [place] [address] [phone]        Called facility again, asked that they mail asap for procedure on 8/27, gave updated address.     Denae Kingston, RN Care Coordinator

## 2020-08-23 ENCOUNTER — OFFICE VISIT (OUTPATIENT)
Dept: FAMILY MEDICINE | Facility: OTHER | Age: 55
End: 2020-08-23
Attending: INTERNAL MEDICINE
Payer: COMMERCIAL

## 2020-08-23 DIAGNOSIS — Z01.818 PREOP GENERAL PHYSICAL EXAM: Primary | ICD-10-CM

## 2020-08-23 PROCEDURE — U0003 INFECTIOUS AGENT DETECTION BY NUCLEIC ACID (DNA OR RNA); SEVERE ACUTE RESPIRATORY SYNDROME CORONAVIRUS 2 (SARS-COV-2) (CORONAVIRUS DISEASE [COVID-19]), AMPLIFIED PROBE TECHNIQUE, MAKING USE OF HIGH THROUGHPUT TECHNOLOGIES AS DESCRIBED BY CMS-2020-01-R: HCPCS | Performed by: FAMILY MEDICINE

## 2020-08-24 LAB
SARS-COV-2 RNA SPEC QL NAA+PROBE: NOT DETECTED
SPECIMEN SOURCE: NORMAL

## 2020-08-27 ENCOUNTER — ANESTHESIA EVENT (OUTPATIENT)
Dept: SURGERY | Facility: CLINIC | Age: 55
End: 2020-08-27
Payer: COMMERCIAL

## 2020-08-27 ENCOUNTER — ANESTHESIA (OUTPATIENT)
Dept: SURGERY | Facility: CLINIC | Age: 55
End: 2020-08-27
Payer: COMMERCIAL

## 2020-08-27 ENCOUNTER — APPOINTMENT (OUTPATIENT)
Dept: GENERAL RADIOLOGY | Facility: CLINIC | Age: 55
End: 2020-08-27
Attending: INTERNAL MEDICINE
Payer: COMMERCIAL

## 2020-08-27 ENCOUNTER — HOSPITAL ENCOUNTER (OUTPATIENT)
Facility: CLINIC | Age: 55
Setting detail: OBSERVATION
Discharge: HOME | End: 2020-08-28
Attending: INTERNAL MEDICINE | Admitting: INTERNAL MEDICINE
Payer: COMMERCIAL

## 2020-08-27 DIAGNOSIS — R11.2 NAUSEA AND VOMITING, INTRACTABILITY OF VOMITING NOT SPECIFIED, UNSPECIFIED VOMITING TYPE: Primary | ICD-10-CM

## 2020-08-27 DIAGNOSIS — K85.00 IDIOPATHIC ACUTE PANCREATITIS: ICD-10-CM

## 2020-08-27 PROBLEM — K91.89 POST-ERCP ACUTE PANCREATITIS: Status: ACTIVE | Noted: 2020-08-27

## 2020-08-27 PROBLEM — K85.90 POST-ERCP ACUTE PANCREATITIS: Status: ACTIVE | Noted: 2020-08-27

## 2020-08-27 PROBLEM — R10.9 ABDOMINAL PAIN: Status: ACTIVE | Noted: 2020-08-27

## 2020-08-27 LAB
ALBUMIN SERPL-MCNC: 3.6 G/DL (ref 3.4–5)
ALP SERPL-CCNC: 141 U/L (ref 40–150)
ALT SERPL W P-5'-P-CCNC: 39 U/L (ref 0–50)
AMYLASE SERPL-CCNC: 29 U/L (ref 30–110)
AMYLASE SERPL-CCNC: 40 U/L (ref 30–110)
ANION GAP SERPL CALCULATED.3IONS-SCNC: 6 MMOL/L (ref 3–14)
AST SERPL W P-5'-P-CCNC: 32 U/L (ref 0–45)
BILIRUB SERPL-MCNC: 0.8 MG/DL (ref 0.2–1.3)
BUN SERPL-MCNC: 15 MG/DL (ref 7–30)
CALCIUM SERPL-MCNC: 9.2 MG/DL (ref 8.5–10.1)
CHLORIDE SERPL-SCNC: 110 MMOL/L (ref 94–109)
CO2 SERPL-SCNC: 24 MMOL/L (ref 20–32)
CREAT SERPL-MCNC: 0.66 MG/DL (ref 0.52–1.04)
ERYTHROCYTE [DISTWIDTH] IN BLOOD BY AUTOMATED COUNT: 13.7 % (ref 10–15)
GFR SERPL CREATININE-BSD FRML MDRD: >90 ML/MIN/{1.73_M2}
GLUCOSE SERPL-MCNC: 99 MG/DL (ref 70–99)
HCT VFR BLD AUTO: 44.2 % (ref 35–47)
HGB BLD-MCNC: 14.2 G/DL (ref 11.7–15.7)
INR PPP: 0.89 (ref 0.86–1.14)
LIPASE SERPL-CCNC: 257 U/L (ref 73–393)
LIPASE SERPL-CCNC: 409 U/L (ref 73–393)
MCH RBC QN AUTO: 29.6 PG (ref 26.5–33)
MCHC RBC AUTO-ENTMCNC: 32.1 G/DL (ref 31.5–36.5)
MCV RBC AUTO: 92 FL (ref 78–100)
PLATELET # BLD AUTO: 240 10E9/L (ref 150–450)
POTASSIUM SERPL-SCNC: 3.7 MMOL/L (ref 3.4–5.3)
PROT SERPL-MCNC: 7.5 G/DL (ref 6.8–8.8)
RBC # BLD AUTO: 4.79 10E12/L (ref 3.8–5.2)
SODIUM SERPL-SCNC: 140 MMOL/L (ref 133–144)
WBC # BLD AUTO: 6.8 10E9/L (ref 4–11)

## 2020-08-27 PROCEDURE — 99219 ZZC INITIAL OBSERVATION CARE,LEVL II: CPT | Mod: GC | Performed by: INTERNAL MEDICINE

## 2020-08-27 PROCEDURE — 83690 ASSAY OF LIPASE: CPT | Performed by: STUDENT IN AN ORGANIZED HEALTH CARE EDUCATION/TRAINING PROGRAM

## 2020-08-27 PROCEDURE — 25000128 H RX IP 250 OP 636: Performed by: ANESTHESIOLOGY

## 2020-08-27 PROCEDURE — 25500064 ZZH RX 255 OP 636: Performed by: INTERNAL MEDICINE

## 2020-08-27 PROCEDURE — 71000015 ZZH RECOVERY PHASE 1 LEVEL 2 EA ADDTL HR: Performed by: INTERNAL MEDICINE

## 2020-08-27 PROCEDURE — 36000057 ZZH SURGERY LEVEL 3 1ST 30 MIN - UMMC: Performed by: INTERNAL MEDICINE

## 2020-08-27 PROCEDURE — 25000128 H RX IP 250 OP 636: Performed by: NURSE ANESTHETIST, CERTIFIED REGISTERED

## 2020-08-27 PROCEDURE — G0378 HOSPITAL OBSERVATION PER HR: HCPCS

## 2020-08-27 PROCEDURE — 27210794 ZZH OR GENERAL SUPPLY STERILE: Performed by: INTERNAL MEDICINE

## 2020-08-27 PROCEDURE — 25800030 ZZH RX IP 258 OP 636: Performed by: STUDENT IN AN ORGANIZED HEALTH CARE EDUCATION/TRAINING PROGRAM

## 2020-08-27 PROCEDURE — 25000125 ZZHC RX 250: Performed by: NURSE ANESTHETIST, CERTIFIED REGISTERED

## 2020-08-27 PROCEDURE — 40000170 ZZH STATISTIC PRE-PROCEDURE ASSESSMENT II: Performed by: INTERNAL MEDICINE

## 2020-08-27 PROCEDURE — 80053 COMPREHEN METABOLIC PANEL: CPT | Performed by: INTERNAL MEDICINE

## 2020-08-27 PROCEDURE — 25000125 ZZHC RX 250: Performed by: INTERNAL MEDICINE

## 2020-08-27 PROCEDURE — 82150 ASSAY OF AMYLASE: CPT | Performed by: STUDENT IN AN ORGANIZED HEALTH CARE EDUCATION/TRAINING PROGRAM

## 2020-08-27 PROCEDURE — 96361 HYDRATE IV INFUSION ADD-ON: CPT

## 2020-08-27 PROCEDURE — 36415 COLL VENOUS BLD VENIPUNCTURE: CPT | Performed by: INTERNAL MEDICINE

## 2020-08-27 PROCEDURE — 85610 PROTHROMBIN TIME: CPT | Performed by: INTERNAL MEDICINE

## 2020-08-27 PROCEDURE — 71000014 ZZH RECOVERY PHASE 1 LEVEL 2 FIRST HR: Performed by: INTERNAL MEDICINE

## 2020-08-27 PROCEDURE — C1877 STENT, NON-COAT/COV W/O DEL: HCPCS | Performed by: INTERNAL MEDICINE

## 2020-08-27 PROCEDURE — 36000059 ZZH SURGERY LEVEL 3 EA 15 ADDTL MIN UMMC: Performed by: INTERNAL MEDICINE

## 2020-08-27 PROCEDURE — 25800030 ZZH RX IP 258 OP 636: Performed by: ANESTHESIOLOGY

## 2020-08-27 PROCEDURE — 96374 THER/PROPH/DIAG INJ IV PUSH: CPT | Mod: 59

## 2020-08-27 PROCEDURE — 71000027 ZZH RECOVERY PHASE 2 EACH 15 MINS: Performed by: INTERNAL MEDICINE

## 2020-08-27 PROCEDURE — 83690 ASSAY OF LIPASE: CPT | Mod: 91 | Performed by: INTERNAL MEDICINE

## 2020-08-27 PROCEDURE — 40000277 XR SURGERY CARM FLUORO LESS THAN 5 MIN W STILLS: Mod: TC

## 2020-08-27 PROCEDURE — C1769 GUIDE WIRE: HCPCS | Performed by: INTERNAL MEDICINE

## 2020-08-27 PROCEDURE — 85027 COMPLETE CBC AUTOMATED: CPT | Performed by: INTERNAL MEDICINE

## 2020-08-27 PROCEDURE — 37000008 ZZH ANESTHESIA TECHNICAL FEE, 1ST 30 MIN: Performed by: INTERNAL MEDICINE

## 2020-08-27 PROCEDURE — 25000566 ZZH SEVOFLURANE, EA 15 MIN: Performed by: INTERNAL MEDICINE

## 2020-08-27 PROCEDURE — 25000128 H RX IP 250 OP 636: Performed by: STUDENT IN AN ORGANIZED HEALTH CARE EDUCATION/TRAINING PROGRAM

## 2020-08-27 PROCEDURE — C1726 CATH, BAL DIL, NON-VASCULAR: HCPCS | Performed by: INTERNAL MEDICINE

## 2020-08-27 PROCEDURE — 82150 ASSAY OF AMYLASE: CPT | Performed by: INTERNAL MEDICINE

## 2020-08-27 PROCEDURE — 25800030 ZZH RX IP 258 OP 636: Performed by: NURSE ANESTHETIST, CERTIFIED REGISTERED

## 2020-08-27 PROCEDURE — 37000009 ZZH ANESTHESIA TECHNICAL FEE, EACH ADDTL 15 MIN: Performed by: INTERNAL MEDICINE

## 2020-08-27 DEVICE — STENT FREEMAN PANCREA FLEX 4FRX11CM W/O FLANGE SGL PIGTAIL: Type: IMPLANTABLE DEVICE | Site: PANCREATIC DUCT | Status: FUNCTIONAL

## 2020-08-27 DEVICE — STENT FREEMAN PANCREA FLEX 5FRX7CM SGL PIGTAIL: Type: IMPLANTABLE DEVICE | Site: BILE DUCT | Status: FUNCTIONAL

## 2020-08-27 RX ORDER — ONDANSETRON 4 MG/1
4 TABLET, ORALLY DISINTEGRATING ORAL EVERY 30 MIN PRN
Status: DISCONTINUED | OUTPATIENT
Start: 2020-08-27 | End: 2020-08-27 | Stop reason: HOSPADM

## 2020-08-27 RX ORDER — INDOMETHACIN 50 MG/1
SUPPOSITORY RECTAL PRN
Status: DISCONTINUED | OUTPATIENT
Start: 2020-08-27 | End: 2020-08-27 | Stop reason: HOSPADM

## 2020-08-27 RX ORDER — LIDOCAINE 40 MG/G
CREAM TOPICAL
Status: DISCONTINUED | OUTPATIENT
Start: 2020-08-27 | End: 2020-08-27 | Stop reason: HOSPADM

## 2020-08-27 RX ORDER — MEPERIDINE HYDROCHLORIDE 25 MG/ML
12.5 INJECTION INTRAMUSCULAR; INTRAVENOUS; SUBCUTANEOUS
Status: DISCONTINUED | OUTPATIENT
Start: 2020-08-27 | End: 2020-08-27 | Stop reason: HOSPADM

## 2020-08-27 RX ORDER — PROCHLORPERAZINE 25 MG
25 SUPPOSITORY, RECTAL RECTAL EVERY 12 HOURS PRN
Status: DISCONTINUED | OUTPATIENT
Start: 2020-08-27 | End: 2020-08-27

## 2020-08-27 RX ORDER — ACETAMINOPHEN 325 MG/1
650 TABLET ORAL EVERY 4 HOURS PRN
Status: DISCONTINUED | OUTPATIENT
Start: 2020-08-27 | End: 2020-08-28 | Stop reason: HOSPADM

## 2020-08-27 RX ORDER — ONDANSETRON 2 MG/ML
INJECTION INTRAMUSCULAR; INTRAVENOUS PRN
Status: DISCONTINUED | OUTPATIENT
Start: 2020-08-27 | End: 2020-08-27

## 2020-08-27 RX ORDER — KETAMINE HYDROCHLORIDE 10 MG/ML
INJECTION INTRAMUSCULAR; INTRAVENOUS PRN
Status: DISCONTINUED | OUTPATIENT
Start: 2020-08-27 | End: 2020-08-27

## 2020-08-27 RX ORDER — SODIUM CHLORIDE, SODIUM LACTATE, POTASSIUM CHLORIDE, CALCIUM CHLORIDE 600; 310; 30; 20 MG/100ML; MG/100ML; MG/100ML; MG/100ML
INJECTION, SOLUTION INTRAVENOUS CONTINUOUS
Status: DISCONTINUED | OUTPATIENT
Start: 2020-08-27 | End: 2020-08-27 | Stop reason: HOSPADM

## 2020-08-27 RX ORDER — METOCLOPRAMIDE HYDROCHLORIDE 5 MG/ML
INJECTION INTRAMUSCULAR; INTRAVENOUS PRN
Status: DISCONTINUED | OUTPATIENT
Start: 2020-08-27 | End: 2020-08-27

## 2020-08-27 RX ORDER — OXYCODONE HYDROCHLORIDE 5 MG/1
5 TABLET ORAL EVERY 4 HOURS PRN
Status: DISCONTINUED | OUTPATIENT
Start: 2020-08-27 | End: 2020-08-28 | Stop reason: HOSPADM

## 2020-08-27 RX ORDER — HYDROMORPHONE HYDROCHLORIDE 1 MG/ML
.3-.5 INJECTION, SOLUTION INTRAMUSCULAR; INTRAVENOUS; SUBCUTANEOUS EVERY 10 MIN PRN
Status: DISCONTINUED | OUTPATIENT
Start: 2020-08-27 | End: 2020-08-27 | Stop reason: HOSPADM

## 2020-08-27 RX ORDER — FENTANYL CITRATE 50 UG/ML
INJECTION, SOLUTION INTRAMUSCULAR; INTRAVENOUS PRN
Status: DISCONTINUED | OUTPATIENT
Start: 2020-08-27 | End: 2020-08-27

## 2020-08-27 RX ORDER — NALOXONE HYDROCHLORIDE 0.4 MG/ML
.1-.4 INJECTION, SOLUTION INTRAMUSCULAR; INTRAVENOUS; SUBCUTANEOUS
Status: ACTIVE | OUTPATIENT
Start: 2020-08-27 | End: 2020-08-28

## 2020-08-27 RX ORDER — NALOXONE HYDROCHLORIDE 0.4 MG/ML
.1-.4 INJECTION, SOLUTION INTRAMUSCULAR; INTRAVENOUS; SUBCUTANEOUS
Status: DISCONTINUED | OUTPATIENT
Start: 2020-08-27 | End: 2020-08-27 | Stop reason: HOSPADM

## 2020-08-27 RX ORDER — LIDOCAINE 40 MG/G
CREAM TOPICAL
Status: DISCONTINUED | OUTPATIENT
Start: 2020-08-27 | End: 2020-08-28 | Stop reason: HOSPADM

## 2020-08-27 RX ORDER — NALOXONE HYDROCHLORIDE 0.4 MG/ML
.1-.4 INJECTION, SOLUTION INTRAMUSCULAR; INTRAVENOUS; SUBCUTANEOUS
Status: DISCONTINUED | OUTPATIENT
Start: 2020-08-27 | End: 2020-08-28 | Stop reason: HOSPADM

## 2020-08-27 RX ORDER — ONDANSETRON 2 MG/ML
4 INJECTION INTRAMUSCULAR; INTRAVENOUS EVERY 6 HOURS PRN
Status: DISCONTINUED | OUTPATIENT
Start: 2020-08-27 | End: 2020-08-28 | Stop reason: HOSPADM

## 2020-08-27 RX ORDER — PROCHLORPERAZINE MALEATE 5 MG
10 TABLET ORAL EVERY 6 HOURS PRN
Status: DISCONTINUED | OUTPATIENT
Start: 2020-08-27 | End: 2020-08-28 | Stop reason: HOSPADM

## 2020-08-27 RX ORDER — PROPOFOL 10 MG/ML
INJECTION, EMULSION INTRAVENOUS PRN
Status: DISCONTINUED | OUTPATIENT
Start: 2020-08-27 | End: 2020-08-27

## 2020-08-27 RX ORDER — DEXAMETHASONE SODIUM PHOSPHATE 4 MG/ML
INJECTION, SOLUTION INTRA-ARTICULAR; INTRALESIONAL; INTRAMUSCULAR; INTRAVENOUS; SOFT TISSUE PRN
Status: DISCONTINUED | OUTPATIENT
Start: 2020-08-27 | End: 2020-08-27

## 2020-08-27 RX ORDER — LIDOCAINE HYDROCHLORIDE 20 MG/ML
INJECTION, SOLUTION INFILTRATION; PERINEURAL PRN
Status: DISCONTINUED | OUTPATIENT
Start: 2020-08-27 | End: 2020-08-27

## 2020-08-27 RX ORDER — IOPAMIDOL 510 MG/ML
INJECTION, SOLUTION INTRAVASCULAR PRN
Status: DISCONTINUED | OUTPATIENT
Start: 2020-08-27 | End: 2020-08-27 | Stop reason: HOSPADM

## 2020-08-27 RX ORDER — ONDANSETRON 4 MG/1
4 TABLET, ORALLY DISINTEGRATING ORAL EVERY 6 HOURS PRN
Status: DISCONTINUED | OUTPATIENT
Start: 2020-08-27 | End: 2020-08-28 | Stop reason: HOSPADM

## 2020-08-27 RX ORDER — PROPOFOL 10 MG/ML
INJECTION, EMULSION INTRAVENOUS CONTINUOUS PRN
Status: DISCONTINUED | OUTPATIENT
Start: 2020-08-27 | End: 2020-08-27

## 2020-08-27 RX ORDER — SODIUM CHLORIDE, SODIUM LACTATE, POTASSIUM CHLORIDE, CALCIUM CHLORIDE 600; 310; 30; 20 MG/100ML; MG/100ML; MG/100ML; MG/100ML
INJECTION, SOLUTION INTRAVENOUS CONTINUOUS
Status: DISCONTINUED | OUTPATIENT
Start: 2020-08-27 | End: 2020-08-28

## 2020-08-27 RX ORDER — FENTANYL CITRATE 50 UG/ML
25-50 INJECTION, SOLUTION INTRAMUSCULAR; INTRAVENOUS EVERY 5 MIN PRN
Status: DISCONTINUED | OUTPATIENT
Start: 2020-08-27 | End: 2020-08-27 | Stop reason: HOSPADM

## 2020-08-27 RX ORDER — ONDANSETRON 2 MG/ML
4 INJECTION INTRAMUSCULAR; INTRAVENOUS EVERY 30 MIN PRN
Status: DISCONTINUED | OUTPATIENT
Start: 2020-08-27 | End: 2020-08-27 | Stop reason: HOSPADM

## 2020-08-27 RX ORDER — FLUMAZENIL 0.1 MG/ML
0.2 INJECTION, SOLUTION INTRAVENOUS
Status: ACTIVE | OUTPATIENT
Start: 2020-08-27 | End: 2020-08-28

## 2020-08-27 RX ADMIN — SODIUM CHLORIDE, POTASSIUM CHLORIDE, SODIUM LACTATE AND CALCIUM CHLORIDE: 600; 310; 30; 20 INJECTION, SOLUTION INTRAVENOUS at 12:48

## 2020-08-27 RX ADMIN — HYDROMORPHONE HYDROCHLORIDE 0.5 MG: 1 INJECTION, SOLUTION INTRAMUSCULAR; INTRAVENOUS; SUBCUTANEOUS at 13:28

## 2020-08-27 RX ADMIN — SODIUM CHLORIDE, POTASSIUM CHLORIDE, SODIUM LACTATE AND CALCIUM CHLORIDE: 600; 310; 30; 20 INJECTION, SOLUTION INTRAVENOUS at 15:52

## 2020-08-27 RX ADMIN — GLUCAGON HYDROCHLORIDE 0.4 MG: KIT at 10:01

## 2020-08-27 RX ADMIN — SODIUM CHLORIDE, POTASSIUM CHLORIDE, SODIUM LACTATE AND CALCIUM CHLORIDE: 600; 310; 30; 20 INJECTION, SOLUTION INTRAVENOUS at 10:18

## 2020-08-27 RX ADMIN — SODIUM CHLORIDE, POTASSIUM CHLORIDE, SODIUM LACTATE AND CALCIUM CHLORIDE: 600; 310; 30; 20 INJECTION, SOLUTION INTRAVENOUS at 19:58

## 2020-08-27 RX ADMIN — Medication 30 MG: at 08:15

## 2020-08-27 RX ADMIN — ONDANSETRON 4 MG: 2 INJECTION INTRAMUSCULAR; INTRAVENOUS at 23:25

## 2020-08-27 RX ADMIN — MIDAZOLAM 1 MG: 1 INJECTION INTRAMUSCULAR; INTRAVENOUS at 08:04

## 2020-08-27 RX ADMIN — FENTANYL CITRATE 25 MCG: 50 INJECTION, SOLUTION INTRAMUSCULAR; INTRAVENOUS at 11:10

## 2020-08-27 RX ADMIN — PROPOFOL 125 MCG/KG/MIN: 10 INJECTION, EMULSION INTRAVENOUS at 08:15

## 2020-08-27 RX ADMIN — PHENYLEPHRINE HYDROCHLORIDE 100 MCG: 10 INJECTION INTRAVENOUS at 08:46

## 2020-08-27 RX ADMIN — HYDROMORPHONE HYDROCHLORIDE 0.5 MG: 1 INJECTION, SOLUTION INTRAMUSCULAR; INTRAVENOUS; SUBCUTANEOUS at 11:18

## 2020-08-27 RX ADMIN — FENTANYL CITRATE 50 MCG: 50 INJECTION, SOLUTION INTRAMUSCULAR; INTRAVENOUS at 08:17

## 2020-08-27 RX ADMIN — SODIUM CHLORIDE, POTASSIUM CHLORIDE, SODIUM LACTATE AND CALCIUM CHLORIDE: 600; 310; 30; 20 INJECTION, SOLUTION INTRAVENOUS at 11:39

## 2020-08-27 RX ADMIN — SODIUM CHLORIDE, POTASSIUM CHLORIDE, SODIUM LACTATE AND CALCIUM CHLORIDE: 600; 310; 30; 20 INJECTION, SOLUTION INTRAVENOUS at 08:00

## 2020-08-27 RX ADMIN — LIDOCAINE HYDROCHLORIDE 100 MG: 20 INJECTION, SOLUTION INFILTRATION; PERINEURAL at 08:15

## 2020-08-27 RX ADMIN — SODIUM CHLORIDE, POTASSIUM CHLORIDE, SODIUM LACTATE AND CALCIUM CHLORIDE 1000 ML: 600; 310; 30; 20 INJECTION, SOLUTION INTRAVENOUS at 10:49

## 2020-08-27 RX ADMIN — Medication 10 MG: at 09:09

## 2020-08-27 RX ADMIN — ONDANSETRON 4 MG: 2 INJECTION INTRAMUSCULAR; INTRAVENOUS at 10:11

## 2020-08-27 RX ADMIN — PROPOFOL 80 MG: 10 INJECTION, EMULSION INTRAVENOUS at 08:17

## 2020-08-27 RX ADMIN — ONDANSETRON 4 MG: 2 INJECTION INTRAMUSCULAR; INTRAVENOUS at 16:45

## 2020-08-27 RX ADMIN — SUGAMMADEX 160 MG: 100 INJECTION, SOLUTION INTRAVENOUS at 10:12

## 2020-08-27 RX ADMIN — DEXAMETHASONE SODIUM PHOSPHATE 4 MG: 4 INJECTION, SOLUTION INTRA-ARTICULAR; INTRALESIONAL; INTRAMUSCULAR; INTRAVENOUS; SOFT TISSUE at 08:26

## 2020-08-27 RX ADMIN — PROCHLORPERAZINE EDISYLATE 10 MG: 5 INJECTION INTRAMUSCULAR; INTRAVENOUS at 17:13

## 2020-08-27 RX ADMIN — PROCHLORPERAZINE EDISYLATE 10 MG: 5 INJECTION INTRAMUSCULAR; INTRAVENOUS at 12:46

## 2020-08-27 RX ADMIN — SODIUM CHLORIDE, POTASSIUM CHLORIDE, SODIUM LACTATE AND CALCIUM CHLORIDE: 600; 310; 30; 20 INJECTION, SOLUTION INTRAVENOUS at 23:28

## 2020-08-27 RX ADMIN — METOCLOPRAMIDE HYDROCHLORIDE 10 MG: 5 INJECTION INTRAMUSCULAR; INTRAVENOUS at 08:37

## 2020-08-27 RX ADMIN — GLUCAGON HYDROCHLORIDE 0.4 MG: KIT at 09:44

## 2020-08-27 RX ADMIN — FENTANYL CITRATE 50 MCG: 50 INJECTION, SOLUTION INTRAMUSCULAR; INTRAVENOUS at 10:37

## 2020-08-27 RX ADMIN — FENTANYL CITRATE 25 MCG: 50 INJECTION, SOLUTION INTRAMUSCULAR; INTRAVENOUS at 10:58

## 2020-08-27 RX ADMIN — ROCURONIUM BROMIDE 50 MG: 10 INJECTION INTRAVENOUS at 08:17

## 2020-08-27 RX ADMIN — ONDANSETRON 4 MG: 2 INJECTION INTRAMUSCULAR; INTRAVENOUS at 12:14

## 2020-08-27 RX ADMIN — PHENYLEPHRINE HYDROCHLORIDE 100 MCG: 10 INJECTION INTRAVENOUS at 09:27

## 2020-08-27 ASSESSMENT — MIFFLIN-ST. JEOR: SCORE: 1302.13

## 2020-08-27 NOTE — BRIEF OP NOTE
[facility] Brief Operative Note    Pre-operative diagnosis: Idiopathic acute pancreatitis [K85.00]   Post-operative diagnosis Papillary stenosis   Procedure: Procedure(s):  ENDOSCOPIC ULTRASOUND, ESOPHAGOSCOPY / UPPER GASTROINTESTINAL TRACT (GI)  ENDOSCOPIC RETROGRADE CHOLANGIOPANCREATOGRAPHY Biliary sphincterotomy, biliary stent placement, pancreatic duct  stent placement   Surgeon: Rod Boston MD; Ramesh Gamez MD   Assistants(s): Tyrone Esposito MD   Estimated blood loss: None    Specimens: None   Findings: - EUS: Normal pancreas and biliary ductal system.  - ERCP: Papillary stenosis. Normal pancreatic and bile ducts. Biliary sphincterotomy performed. PD and CBD stents deployed.     Recommendations:  - Observe in PACU for 2-hours b/c patient high-risk for post-ERCP pancreatitis.  - Clear liquid diet.  - Check amylase and lipase in 2-hours.  - Administer lactated ringer's 1 liter bolus in PACU to mitigate risk post-ERCP pancreatitis.  - Discharge patient if feeling well in 2-hours.      Tyrone Esposito MD on 8/27/2020 at 10:38 AM

## 2020-08-27 NOTE — DISCHARGE INSTRUCTIONS
[facility], [place]  Same-Day Surgery ERCP Procedure   Adult Discharge Orders & Instructions     You had a procedure known as an Endoscopic Retrograde Cholangiopancreatography (ERCP). Your healthcare provider performed the ERCP to look at your bile or pancreatic ducts, and to locate and/or treat blockages (dilation, stenting, removal, etc.) in these ducts. Often biopsies, small samples of tissue, are taken to help diagnose and/or classify stages of disease growth. This procedure is used to diagnose diseases of the pancreas, bile ducts, pancreatic duct, liver, and gallbladder.     After your procedure   1. Make sure to clarify with your healthcare provider any diet restrictions (For example, clear liquid, low fat, no caffeine, etc.)   2. Do NOT take aspirin containing medications or any other blood-thinning medicines (anticoagulants) until your healthcare provider says it's OK.   3. You MAY be prescribed antibiotics, depending on what was done and/or found during your EUS, make sure to take antibiotics as prescribed by your healthcare provider    For 24 hours after surgery  1. Get plenty of rest.  A responsible adult must stay with you for at least 24 hours after you leave the hospital.   2. Do not drive or use heavy equipment.  If you have weakness or tingling, don't drive or use heavy equipment until this feeling goes away.  3. Do not drink alcohol.  4. Avoid strenuous or risky activities (gym, yoga, cycling, etc.).  Ask for help when climbing stairs.   5. You may feel lightheaded.  IF so, sit for a few minutes before standing.  Have someone help you get up.   6. If you have nausea (feel sick to your stomach): Drink only clear liquids such as apple juice, ginger ale, broth or 7-Up.  Rest may also help.  Be sure to drink enough fluids.  Move to a regular diet as you feel able.  7. If you feel bloated or have too much gas, use a heating pad on your belly to help reduce the discomfort.  This should help you feel better.   8. You may have a slight fever. This is normal for the first 24 hours.   9. You may have a dry mouth, a sore throat, muscle aches or trouble sleeping.  These are normal and will go away after 24 hours. A sore throat is most common. Use lozenges or gargle with salt water to ease the discomfort.   10. Do not make important or legal decisions.      Call your doctor for any of the followin. Chest pain, and/or shortness of breath  2. Abdominal  pain, bloating or cramping that has not improved or does not respond to pain reliving medications (Tylenol or narcotics if prescribed)   3. Difficulty swallowing or feeling as though food or liquids are stuck in your throat   4. Sore throat lasting more than 2 days or pain that has worsened over time   5. Black or tarry stools   6. Nausea and/or vomiting that is not resolving or has not responded to anti-nausea medications prescribed to you   7. It has been over 8 to 10 hours since surgery and you are still not able to urinate (pass water)   8. Headache for over 24 hours   9. Fever over 100.5 F (38 C) lasting more than 24 hours after the procedure   10. Signs of jaundice or blockage (fever, chills, abdominal pain, yellowing of the whites of your eyes, yellowing of your skin, and/or passing darker than normal urine)     To contact a doctor, call:   [ ] Dr Boston's office at [phone] (Monday thru Friday 8:00am to 4:30pm)   [ ] [phone] and ask for the gastroenterology resident on call (answered 24 hours a day)   [ ] Emergency Department: [facility]: [phone]     Take it easy when you get home.  Remember, same day surgery DOES NOT MEAN SAME DAY RECOVERY!  Healing is a gradual process.  You will need some time to recover - you may be more tired than you realize at first.  Rest and relax for at least the first 24 hours at home.  You'll feel better and heal faster if you take good care of yourself.

## 2020-08-27 NOTE — H&P
[facility], [place]    History and Physical - [place] 1 Service        Date of Admission:  8/27/2020    Assessment & Plan   Lesley Hyde is a 55 year old female admitted on 8/27/2020. 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    #Acute abdominal pain, nausea, and vomiting s/p EUS/ERCP with pancreatobiliary stenting 8/27  #History of cholecystectomy 2017  #Idiopathic recurrent acute pancreatitis  Patient s/p EUS/ERCP today, with plan for discharge same day however developed abdominal pain, nausea and vomiting s/p procedure in PACU. Per GI, likely is visceral hypersensitivity following procedure. Patient to be admitted to observation overnight for monitoring and symptomatic treatment. Per GI reccs, pain control with acetaminophen and narcotics (avoid NSAIDs post ERCP due to bleeding risk). Ok to advance diet as tolerated, begin with clear liquid diet. Also recommended aggressive fluids to prevent post-ERCP pancreatitis, for which she is at high risk.  - Pain control: Acetaminophen 650mg Q4 PRN, Oxycodone 5mg Q4H PRN, AVOID NSAIDs  - Nausea: Zofran 4mg PO/IV Q6H PRN, compazine 10mg PO Q6H PRN second line  - ADAT (clear liquid diet, then advance)  - mIVF LR at 200cc/hr for ~10 hours    #GERD s/p LINX 2016  #History erosive reflux disease  Status post Linx in 2016 at outside facility. Per patient, she is no longer taking PTA ranitidine or omeprazole.    #Hx of fibromyalgia  Confirmed with patient, not on medications for this at present.     Diet: Clear Liquid Diet    Fluids: LR  at 200 ml/hr for ~10 hours  DVT Prophylaxis: Pneumatic Compression Devices  Bruner Catheter: not present  Code Status: Full       Disposition Plan   Expected discharge: Tomorrow, recommended to prior living arrangement once adequate pain management/ tolerating PO medications.  Entered: Robe Polanco MD 08/27/2020, 2:40 PM     The patient's care was discussed with the Attending Physician, Dr. Yeager, who agrees with plan per his attestation.    Robe Polanco MD  Medicine-Pediatrics, PGY-2  [facility] 1 Service  [facility], [place]  Pager: [phone]  Please see sticky note for cross cover information  ______________________________________________________________________    Chief Complaint   Abdominal pain    History is obtained from the patient and Epic    History of Present Illness   Lesley Hyde is a 55 year old female with PMH significant for gallbladder disease s/p laparoscopic cholecystectomy (3/5/17), idiopathic gastroparesis, GERD s/p LINX (5/2016), prior sigmoid colonic diverticulitis (CT 3/29/17), fibromyalgia, and idiopathic recurrent acute pancreatitis with 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 Patient is s/p EUS/ERCP with pancreatobiliary stenting today. She is a patient of Dr. Gamez from GI. Patient has history notable for normal MRCP in 4/2020, though did have episode of acute pancreatitis in early June 2020. She additionally has had episodes of intermittent elevations in liver enzymes. Due to GI'sconcern for recurrent idiopathic acute pancreastitis with transient obstruction at the ampulla of vater, she underwent EUS/ERCP with Dr. Gamez. Procedure uncomplicated and included prophylactic pancreaticobiliary stenting. However, post-procedure patient continued to have abdominal pain, as well nausea and vomiting. This did not improve in the 2-hour post-procedural observation period, so decision made  for admission to  Medicine observation for further workup and monitoring. Upon admission to floor, patient says her abdominal pain has improved with pain meds, though she is still feeling somewhat nauseous especially when talking. No fevers. No chest pain or difficulty breathing. Patient is otherwise without new complaints at present. She arrives to floor hemodynamically stable, afebrile and VSS, admitted to [place] [id number] team for further management.    Review of Systems    10 point ROS completed and negative except as noted above in HPI.    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 She denies tobacco use. She endorses infrequent EtOH use (<1 drink per week), and denies other drug use.     Family History   Acute pancreatitis (father)  Pancreatic cancer (father)    Prior to Admission Medications   Prior to Admission Medications   Prescriptions Last Dose Informant Patient Reported? Taking?   Acetaminophen (TYLENOL PO) 8/26/2020 at Unknown time  Yes Yes   Sig: Take  by mouth. prn   estradiol (CLIMARA) 0.05 MG/24HR weekly patch 8/24/2020  Yes Yes   Sig: Place 1 patch onto the skin   ibuprofen (ADVIL/MOTRIN) 800 MG tablet Past Month at Unknown time  Yes Yes   Sig: Take 800 mg by mouth   omeprazole (PRILOSEC) 40 MG capsule   No No   Sig: Take 1 capsule by mouth daily. Please take one tab 30 min before breakfast.   ranitidine (ZANTAC) 150 MG tablet   No No   Sig: Take 1 tablet by mouth daily.      Facility-Administered Medications: None     Allergies   Allergies   Allergen Reactions     Morphine GI Disturbance     Physical Exam   Vital Signs: Temp:  98  F (36.7  C) Temp src: Oral BP: 132/89 Pulse: 83   Resp: 14 SpO2: 94 % O2 Device: Nasal cannula Oxygen Delivery: 3 LPM  Weight: 160 lbs 7.92 oz    General: Laying in bed, tired appearing but awakens appropriately and answering all questions/following commands, appears comfortable, NAD  HEENT: AT/NT, EOMI, anicteric sclerae, MMM  Cardiovascular: RRR, no murmurs  Pulm: CTAB, breathing comfortably on room air, no wheezes or crackles  Abdomen: Soft, non-distended, very mildly tender to palpation throughout abdomen, +BS , no masses  Extremities: WWP, no pitting edema in lower extremities bilaterally  Skin: Warm, dry, no rashes or bruising  Neuro: A&Ox3, CNII-XII grossly intact, moves all extremities, no focal deficits noted    Data   Data reviewed today: I reviewed all medications, new labs and imaging results over the last 24 hours.    Reviewed in Epic

## 2020-08-27 NOTE — ANESTHESIA PREPROCEDURE EVALUATION
"Anesthesia Pre-Procedure Evaluation    Patient: Lesley Hyde   MRN:     [id number] Gender:   female   Age:    55 year old :      1965        Preoperative Diagnosis: Idiopathic acute pancreatitis [K85.00]   Procedure(s):  ENDOSCOPIC ULTRASOUND, ESOPHAGOSCOPY / UPPER GASTROINTESTINAL TRACT (GI)  ENDOSCOPIC RETROGRADE CHOLANGIOPANCREATOGRAPHY     LABS:  CBC:   Lab Results   Component Value Date    WBC 8.3 2012    HGB 14.2 2012    HCT 44.1 2012     2012     BMP:   Lab Results   Component Value Date     2012    POTASSIUM 4.6 2012    CHLORIDE 104 2012    CO2 28 2012    BUN 12 2012    CR 0.73 2012    GLC 90 2012     COAGS: No results found for: PTT, INR, FIBR  POC: No results found for: BGM, HCG, HCGS  OTHER:   Lab Results   Component Value Date    JONEL 9.6 2012    ALBUMIN 4.6 2012    PROTTOTAL 7.9 2012    ALT 35 2012    AST 54 (H) 2012    ALKPHOS 135 2012    BILITOTAL 0.7 2012    LIPASE 146 2012    AMYLASE 65 2012    TSH 0.72 2012    T4 0.96 2012    CRP 8.9 (H) 2012    SED 9 2012        Preop Vitals    BP Readings from Last 3 Encounters:   12 120/78   12 121/76   12 119/73    Pulse Readings from Last 3 Encounters:   12 85   12 75   12 86      Resp Readings from Last 3 Encounters:   12 12    SpO2 Readings from Last 3 Encounters:   12 100%   12 96%   12 96%      Temp Readings from Last 1 Encounters:   12 36.4  C (97.6  F)    Ht Readings from Last 1 Encounters:   20 1.6 m (5' 3\")      Wt Readings from Last 1 Encounters:   20 72.6 kg (160 lb)    Estimated body mass index is 28.34 kg/m  as calculated from the following:    Height as of 20: 1.6 m (5' 3\").    Weight as of 20: 72.6 kg (160 lb).     LDA:        History reviewed. No pertinent past medical history.   Past Surgical " History:   Procedure Laterality Date     DILATE ESOPHAGUS  4/5/2012    Procedure:DILATE ESOPHAGUS; Surgeon:JONH WALKER; Location:[facility]      Allergies   Allergen Reactions     Morphine GI Disturbance        Anesthesia Evaluation     . Pt has had prior anesthetic. Type: General and MAC           ROS/MED HX    ENT/Pulmonary:       Neurologic:     (+)migraines,     Cardiovascular:         METS/Exercise Tolerance:     Hematologic:         Musculoskeletal:   (+)  other musculoskeletal- Fibromyalgia      GI/Hepatic:     (+) GERD Inflammatory bowel disease (Diverticulitis, IBS), cholecystitis/cholelithiasis, Other GI/Hepatic Idiopathic acute pancreatitis       Renal/Genitourinary:     (+) Other Renal/ Genitourinary (Intewrstitial Cystitis), Interstitial Cystitis      Endo:     (+) type I DM, .      Psychiatric:         Infectious Disease:         Malignancy:         Other:                         PHYSICAL EXAM:   Mental Status/Neuro: A/A/O   Airway: Facies: Feasible  Mallampati: I  Mouth/Opening: Full  TM distance: > 6 cm  Neck ROM: Full   Respiratory: Auscultation: CTAB     Resp. Rate: Normal     Resp. Effort: Normal      CV: Rhythm: Regular  Rate: Age appropriate  Heart: Normal Sounds  Edema: None   Comments:      Dental: Normal Dentition                Assessment:   ASA SCORE: 3    H&P: History and physical reviewed and following examination; no interval change.         Plan:   Anes. Type:  General   Pre-Medication: None   Induction:  IV (Standard)   Airway: ETT; Oral; CMAC/VL   Access/Monitoring: PIV; 2nd PIV   Maintenance: Balanced     Postop Plan:   Postop Pain: Opioids  Postop Sedation/Airway: Not planned  Disposition: Outpatient     PONV Management:   Adult Risk Factors: Female, Postop Opioids   Prevention: Ondansetron, Dexamethasone     CONSENT: Direct conversation   Plan and risks discussed with: Patient   Blood Products: Consent Deferred (Minimal Blood Loss)                   Sonam Hill  MD

## 2020-08-27 NOTE — ANESTHESIA CARE TRANSFER NOTE
Patient: Lesley Hyde    Procedure(s):  ENDOSCOPIC ULTRASOUND, ESOPHAGOSCOPY / UPPER GASTROINTESTINAL TRACT (GI)  ENDOSCOPIC RETROGRADE CHOLANGIOPANCREATOGRAPHY Biliary sphincterotomy, biliary stent placement, pancreatic duct  stent placement    Diagnosis: Idiopathic acute pancreatitis [K85.00]  Diagnosis Additional Information: No value filed.    Anesthesia Type:   General     Note:  Airway :Face Mask  Patient transferred to:PACU  Comments: bp 122/90Handoff Report: Identifed the Patient, Identified the Reponsible Provider, Reviewed the pertinent medical history, Discussed the surgical course, Reviewed Intra-OP anesthesia mangement and issues during anesthesia, Set expectations for post-procedure period and Allowed opportunity for questions and acknowledgement of understanding      Vitals: (Last set prior to Anesthesia Care Transfer)    CRNA VITALS  8/27/2020 0956 - 8/27/2020 1039      8/27/2020             Pulse:  101    Ht Rate:  102    SpO2:  97 %    Resp Rate (observed):  (!) 3                Electronically Signed By: KENNY Disla CRNA  August 27, 2020  10:39 AM

## 2020-08-27 NOTE — CONSULTS
GASTROENTEROLOGY CONSULTATION      Date of Admission:  8/27/2020         ASSESSMENT AND RECOMMENDATIONS:   Assessment:  56 yo F w/ h/o gallbladder hypokinesia s/p laparoscopic cholecystectomy (3/5/17), idiopathic gastroparesis (T1/2 247-min. [4/5/12]), GERD s/p LINX (5/2016), prior sigmoid colonic diverticulitis (CT 3/29/17), and fibromyalgia. 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    Patient presented for outpatient EUS/ERCP (8/27/20) for evaluation and management of IRAP and suspected SOD-II. EUS was essentially normal. ERCP was also normal; empiric biliary sphincterotomy with prophylactic pancreaticobiliary stenting was performed.    Patient is being admitted for post-procedural abdominal pain and nausea. Suspected etiology is visceral hypersensitivity (ie., functional abdominal pain following ERCP).     Recommendations  - Continue Lactated Ringer's high-rate infusion (200 ml/hr) for 10-hours or so to mitigate risk of post-ERCP pancreatitis. Patient is very high risk for PEP. Of note, post-ERCP amylase/lipase are both normal (reassuring).  - Advance diet as tolerated.  - Pain and antiemetic control- Tylenol and opioids. Avoid NSAIDs due to bleeding risk from biliary sphincterotomy.  - Discharge home once symptomatically improved.  - Patient will follow-up with Ramesh Gamez MD (GI).    Tyrone Esposito MD  Advanced Endoscopy Fellow  -------------------------------------------------------------------------------------------------------------------           History of Present Illness:   56 yo F w/ h/o gallbladder hypokinesia s/p laparoscopic cholecystectomy (3/5/17), idiopathic gastroparesis (T1/2 247-min. [4/5/12]), GERD s/p LINX (5/2016), prior sigmoid colonic diverticulitis (CT 3/29/17), and fibromyalgia. Pre- and post-[facility], patient had mild mixed cholestatic-hepatocellular injury: , ALT 55, AST 74  (17)  --> , ,  (3/30/17). Normal MRCP (2020). More recently, patient had an episode of idiopathic acute pancreastitis w/ , AST 93, lipase 488 (20) and CT AP (2020) w/ mild fat stranding around tail of the pancreas and proximal portion of the descending colon (most likely represents early acute pancreatitis). Father apparently had acute pancreatitis and  of pancreatic cancer. Patient denies tobacco and uses minimal alcohol. In summary, patient has had intermittent elevations in liver enzymes and at least one episode of idiopathic interstitial edematous pancreatitis. These findings are consistent with transient obstruction at the ampulla of Vater (DDX: papillary stenosis, biliary sludge, benign ampullary neoplasm). Today (20), patient presented for diagnostic EUS and index ERCP with plans for biliary endoscopic sphincterotomy w/ protectice pancreatic duct stent.    Following procedure (20), patient experienced persistent upper abdominal pain and nausea, which did not improve during the 2-hour post-procedural observation period. Therefore, patient admitted to [facility].         Data:   Key relevant labs: Amylase decreased to 29 from 40 on 20. Lipase increased to 409 from 257 on 20.    Key relevant imaging:  Per my review of cholangiogram (20), normal calibre common bile duct.           Medications:     Current Facility-Administered Medications   Medication     acetaminophen (TYLENOL) tablet 650 mg     flumazenil (ROMAZICON) injection 0.2 mg     lactated ringers infusion     lidocaine (LMX4) cream     lidocaine 1 % 0.1-1 mL     May continue current IV fluid if patient has IV fluids infusing until discharge.     melatonin tablet 1 mg     naloxone (NARCAN) injection 0.1-0.4 mg     naloxone (NARCAN) injection 0.1-0.4 mg     ondansetron (ZOFRAN-ODT) ODT tab 4 mg    Or     ondansetron (ZOFRAN) injection 4 mg     oxyCODONE (ROXICODONE) tablet 5 mg      "prochlorperazine (COMPAZINE) injection 10 mg    Or     prochlorperazine (COMPAZINE) tablet 10 mg    Or     prochlorperazine (COMPAZINE) suppository 25 mg     sodium chloride (PF) 0.9% PF flush 10 mL     sodium chloride (PF) 0.9% PF flush 3 mL     sodium chloride (PF) 0.9% PF flush 3 mL     sodium chloride (PF) 0.9% PF flush 3 mL             Physical Exam:   /84 (BP Location: Right arm)   Pulse 80   Temp 96.2  F (35.7  C) (Temporal)   Resp 16   Ht 1.6 m (5' 3\")   Wt 72.8 kg (160 lb 7.9 oz)   SpO2 94%   BMI 28.43 kg/m    Wt:   Wt Readings from Last 2 Encounters:   08/27/20 72.8 kg (160 lb 7.9 oz)   07/27/20 72.6 kg (160 lb)      Constitutional: Mild distress from abdominal pain. Cooperative, pleasant, not dyspneic/diaphoretic.  Eyes: Sclera anicteric/injected  Ears/nose/mouth/throat: Moist mucous membranes.  Neck: supple, thyroid normal size  CV: No edema  Respiratory: Unlabored breathing  Abd: Non-distended.  Skin: warm, perfused, no jaundice  Neuro: AAO x 3  Psych: Normal affect  MSK: No gross deformities          Past Medical History:   Reviewed and edited as appropriate  PMH: See HPI.         Past Surgical History:   Reviewed and edited as appropriate   Past Surgical History:   Procedure Laterality Date     DILATE ESOPHAGUS  4/5/2012    Procedure:DILATE ESOPHAGUS; Surgeon:JONH WALKER; Location: GI            Social History:   Alcohol use: Denies  Smoking history: Denies         Family History:   Father: Pancreatic cancer.         Allergies:   Reviewed and edited as appropriate     Allergies   Allergen Reactions     Morphine GI Disturbance          Review of Systems:   Constitutional: Denies fevers.  HEENT: Normal vision.  Cardiac: Denies chest pain.  Lungs: Denies SOB.  Abdomen: Endorses abdominal pain.  Extremities: Denies swelling.  Skin: Denies rashes.  Hematology: Denies bruising.  Psych: Denies stress.  Neuro: Denies confusion.    Tyrone Esposito MD on 8/27/2020 at 3:23 " PM

## 2020-08-27 NOTE — ANESTHESIA POSTPROCEDURE EVALUATION
Anesthesia POST Procedure Evaluation    Patient: Lesley Hyde   MRN:     [id number] Gender:   female   Age:    55 year old :      1965        Preoperative Diagnosis: Idiopathic acute pancreatitis [K85.00]   Procedure(s):  ENDOSCOPIC ULTRASOUND, ESOPHAGOSCOPY / UPPER GASTROINTESTINAL TRACT (GI)  ENDOSCOPIC RETROGRADE CHOLANGIOPANCREATOGRAPHY Biliary sphincterotomy, biliary stent placement, pancreatic duct  stent placement   Postop Comments: No value filed.     Anesthesia Type: General       Disposition: Outpatient   Postop Pain Control: Uneventful            Sign Out: Well controlled pain   PONV: No   Neuro/Psych: Uneventful            Sign Out: Acceptable/Baseline neuro status   Airway/Respiratory: Uneventful            Sign Out: Acceptable/Baseline resp. status   CV/Hemodynamics: Uneventful            Sign Out: Acceptable CV status   Other NRE: NONE   DID A NON-ROUTINE EVENT OCCUR? No         Last Anesthesia Record Vitals:  CRNA VITALS  2020 0956 - 2020 1056      2020             Pulse:  101    Ht Rate:  102    SpO2:  97 %    Resp Rate (observed):  (!) 3          Last PACU Vitals:  Vitals Value Taken Time   /85 2020 11:40 AM   Temp 36.4  C (97.6  F) 2020 11:00 AM   Pulse 87 2020 11:49 AM   Resp 14 2020 11:30 AM   SpO2 98 % 2020 11:49 AM   Temp src     NIBP     Pulse     SpO2     Resp     Temp     Ht Rate     Temp 2     Vitals shown include unvalidated device data.      Electronically Signed By: Sonam Hill MD, 2020, 11:50 AM

## 2020-08-27 NOTE — PROGRESS NOTES
..Patient admitted to 5C   Admitted from: 3C  Arrived by:wheelchair  Reason for admission:observation s/p ERCP  Patient accompanied by:  Belongings:wtih patient   Teaching:  Skin double check completed by:  Vital signs stable Capnography going Sats 92% on 4 liters. Patient has nausea with movement  IV patent and infusing lactated ringers in right hand

## 2020-08-28 VITALS
BODY MASS INDEX: 28.99 KG/M2 | OXYGEN SATURATION: 99 % | HEART RATE: 98 BPM | HEIGHT: 63 IN | TEMPERATURE: 97.9 F | WEIGHT: 163.6 LBS | SYSTOLIC BLOOD PRESSURE: 142 MMHG | DIASTOLIC BLOOD PRESSURE: 96 MMHG | RESPIRATION RATE: 18 BRPM

## 2020-08-28 LAB
ALBUMIN SERPL-MCNC: 3 G/DL (ref 3.4–5)
ALP SERPL-CCNC: 112 U/L (ref 40–150)
ALT SERPL W P-5'-P-CCNC: 50 U/L (ref 0–50)
ANION GAP SERPL CALCULATED.3IONS-SCNC: 6 MMOL/L (ref 3–14)
AST SERPL W P-5'-P-CCNC: 32 U/L (ref 0–45)
BILIRUB SERPL-MCNC: 0.5 MG/DL (ref 0.2–1.3)
BUN SERPL-MCNC: 8 MG/DL (ref 7–30)
CALCIUM SERPL-MCNC: 9.3 MG/DL (ref 8.5–10.1)
CHLORIDE SERPL-SCNC: 107 MMOL/L (ref 94–109)
CO2 SERPL-SCNC: 26 MMOL/L (ref 20–32)
CREAT SERPL-MCNC: 0.61 MG/DL (ref 0.52–1.04)
ERCP: NORMAL
ERYTHROCYTE [DISTWIDTH] IN BLOOD BY AUTOMATED COUNT: 13.9 % (ref 10–15)
GFR SERPL CREATININE-BSD FRML MDRD: >90 ML/MIN/{1.73_M2}
GLUCOSE SERPL-MCNC: 108 MG/DL (ref 70–99)
HCT VFR BLD AUTO: 41.2 % (ref 35–47)
HGB BLD-MCNC: 13.4 G/DL (ref 11.7–15.7)
MCH RBC QN AUTO: 29.8 PG (ref 26.5–33)
MCHC RBC AUTO-ENTMCNC: 32.5 G/DL (ref 31.5–36.5)
MCV RBC AUTO: 92 FL (ref 78–100)
PLATELET # BLD AUTO: 235 10E9/L (ref 150–450)
POTASSIUM SERPL-SCNC: 3.8 MMOL/L (ref 3.4–5.3)
PROT SERPL-MCNC: 6.5 G/DL (ref 6.8–8.8)
RBC # BLD AUTO: 4.49 10E12/L (ref 3.8–5.2)
SODIUM SERPL-SCNC: 140 MMOL/L (ref 133–144)
UPPER EUS: NORMAL
WBC # BLD AUTO: 14.8 10E9/L (ref 4–11)

## 2020-08-28 PROCEDURE — 36415 COLL VENOUS BLD VENIPUNCTURE: CPT | Performed by: STUDENT IN AN ORGANIZED HEALTH CARE EDUCATION/TRAINING PROGRAM

## 2020-08-28 PROCEDURE — 96361 HYDRATE IV INFUSION ADD-ON: CPT

## 2020-08-28 PROCEDURE — 96376 TX/PRO/DX INJ SAME DRUG ADON: CPT

## 2020-08-28 PROCEDURE — 99217 ZZC OBSERVATION CARE DISCHARGE: CPT | Mod: GC | Performed by: INTERNAL MEDICINE

## 2020-08-28 PROCEDURE — 80053 COMPREHEN METABOLIC PANEL: CPT | Performed by: STUDENT IN AN ORGANIZED HEALTH CARE EDUCATION/TRAINING PROGRAM

## 2020-08-28 PROCEDURE — 25000128 H RX IP 250 OP 636: Performed by: STUDENT IN AN ORGANIZED HEALTH CARE EDUCATION/TRAINING PROGRAM

## 2020-08-28 PROCEDURE — 25000132 ZZH RX MED GY IP 250 OP 250 PS 637: Performed by: STUDENT IN AN ORGANIZED HEALTH CARE EDUCATION/TRAINING PROGRAM

## 2020-08-28 PROCEDURE — 85027 COMPLETE CBC AUTOMATED: CPT | Performed by: STUDENT IN AN ORGANIZED HEALTH CARE EDUCATION/TRAINING PROGRAM

## 2020-08-28 PROCEDURE — 99207 ZZC CDG-CODE CATEGORY CHANGED: CPT | Performed by: INTERNAL MEDICINE

## 2020-08-28 PROCEDURE — G0378 HOSPITAL OBSERVATION PER HR: HCPCS

## 2020-08-28 RX ORDER — PROCHLORPERAZINE MALEATE 10 MG
10 TABLET ORAL EVERY 6 HOURS PRN
Qty: 10 TABLET | Refills: 0 | Status: SHIPPED | OUTPATIENT
Start: 2020-08-28

## 2020-08-28 RX ADMIN — PROCHLORPERAZINE EDISYLATE 10 MG: 5 INJECTION INTRAMUSCULAR; INTRAVENOUS at 01:27

## 2020-08-28 RX ADMIN — PROCHLORPERAZINE MALEATE 10 MG: 5 TABLET ORAL at 08:04

## 2020-08-28 ASSESSMENT — MIFFLIN-ST. JEOR: SCORE: 1316.21

## 2020-08-28 NOTE — PLAN OF CARE
"BP (!) 141/95   Pulse 100   Temp 97.8  F (36.6  C) (Axillary)   Resp 15   Ht 1.6 m (5' 3\")   Wt 72.8 kg (160 lb 7.9 oz)   SpO2 97%   BMI 28.43 kg/m      AVSS. Slightly hypertensive but within order parameters. 3L O2 overnight. O2 sat 95-97%. Chief c/o n/v overnight. Emesis x2. Gave PRN Zofran and Compazine. Independent to BSC. Calls appropriately. Will continue POC.           PRIMARY DIAGNOSIS: \"GENERIC\" NURSING  OUTPATIENT/OBSERVATION GOALS TO BE MET BEFORE DISCHARGE:  ADLs back to baseline: No     Activity and level of assistance: Ambulating independently.     Pain status: Pain free.     Return to near baseline physical activity: Yes             Discharge Planner Nurse      Safe discharge environment identified: Yes  Barriers to discharge: No       Entered by: Luciano Mcallister 08/28/2020 6:07     Please review provider order for any additional goals.   Nurse to notify provider when observation goals have been met and patient is ready for discharge.            Problem: Adult Inpatient Plan of Care  Goal: Plan of Care Review  Outcome: No Change  Goal: Patient-Specific Goal (Individualization)  Outcome: No Change  Goal: Absence of Hospital-Acquired Illness or Injury  Outcome: No Change  Goal: Optimal Comfort and Wellbeing  Outcome: No Change  Goal: Rounds/Family Conference  Outcome: No Change     Problem: Adult Inpatient Plan of Care  Goal: Readiness for Transition of Care  Outcome: Improving     "

## 2020-08-28 NOTE — PROGRESS NOTES
"PRIMARY DIAGNOSIS: \"GENERIC\" NURSING  OUTPATIENT/OBSERVATION GOALS TO BE MET BEFORE DISCHARGE:  1. ADLs back to baseline: No    2. Activity and level of assistance: Ambulating independently.    3. Pain status: Pain free.    4. Return to near baseline physical activity: Yes     Discharge Planner Nurse   Safe discharge environment identified: Yes  Barriers to discharge: No       Entered by: Luciano Mcallister 08/28/2020 3:53 AM     Please review provider order for any additional goals.   Nurse to notify provider when observation goals have been met and patient is ready for discharge.      "

## 2020-08-28 NOTE — PROGRESS NOTES
Observation goals:      Pending stable vitals: Yes, VSS 98% on room air.      Pending established medical plan: No, pt nauseated. PO compazine given, encouraged to start clear liquids.       Pending stable dispo plan: Yes,  to transport home to [facility]

## 2020-08-28 NOTE — PROGRESS NOTES
"PRIMARY DIAGNOSIS: \"GENERIC\" NURSING  OUTPATIENT/OBSERVATION GOALS TO BE MET BEFORE DISCHARGE:  1. ADLs back to baseline: No    2. Activity and level of assistance: Up with standby assistance.    3. Pain status: Pain free.    4. Return to near baseline physical activity: Yes     Discharge Planner Nurse   Safe discharge environment identified: Yes  Barriers to discharge: No       Entered by: Azucena Wilburn 08/27/2020 10:17 PM     Please review provider order for any additional goals.   Nurse to notify provider when observation goals have been met and patient is ready for discharge.  Observation goals  PER UNIT ROUTINE      Comments: - Pending stable vitals   - Pending established medical plan   - Pending stable dispo plan      Pt with no pain, and minimal nausea;  ERCP site clean, dry/intact.  Pt is sleepy.   "

## 2020-08-28 NOTE — DISCHARGE SUMMARY
[facility], [place]  Observation Discharge Summary - Medicine & Pediatrics       Date of Admission:  8/27/2020  Date of Discharge:  8/28/2020  Discharging Provider: Dr. Rod Yeager  Discharge Service: Michael 1    Discharge Diagnoses   #Acute abdominal pain, nausea, and vomiting s/p EUS/ERCP with pancreatobiliary stenting 8/27  #History of cholecystectomy 2017  #Idiopathic recurrent acute pancreatitis    Follow-ups Needed After Discharge   Follow-up Appointments     Follow Up and recommended labs and tests      Follow up with your Primary Care Provider in 7 days for a post-discharge   follow-up.    Dr. Gamez's ([facility]) office at [facility] will call and set-up follow-up   with you if it is needed. If you have not heard within 1-2 days, please   call [phone] if you haven't heard regarding these appointments             Unresulted Labs Ordered in the Past 30 Days of this Admission     No orders found for last 31 day(s).          Discharge Disposition   Discharged to home  Condition at discharge: Stable    Hospital Course   Lesley Hyde was admitted on 8/27/2020. 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 She underwent EUS/ERCP with pancreatobiliary stenting 8/27 which was complicated by post procedural pain, nausea, and vomiting, admitted for observation and pain control overnight. The following morning patient's pain had improved and she was tolerating PO. The following problems were addressed during her hospitalization:    #Acute abdominal pain, nausea, and vomiting s/p EUS/ERCP with pancreatobiliary stenting 8/27  #History of cholecystectomy 2017  #Idiopathic recurrent acute pancreatitis  Pain well controlled prior to discharge, not requiring narcotics at discharge. Tolerating diet. She was advised to  avoid NSAIDs per GI reccs, can treat and additional pain with PRN tylenol. Prescription for PRN compazine given at discharge if she has nausea. Per GI, they will contact patient in next 1-2 days regarding follow-up plans. Patient understands this plan and will call if she has not heard in this time.    Consultations This Hospital Stay   None    Code Status   Full     The patient was discussed with Dr. Yeager, who agrees with summary per his attestation.    Robe Polanco MD   Medicine-Pediatrics, PGY-2  [facility]  [facility]  ______________________________________________________________________    Physical Exam   Vital Signs: Temp: 97.9  F (36.6  C) Temp src: Axillary BP: (!) 142/96 Pulse: 98   Resp: 18 SpO2: 99 % O2 Device: None (Room air) Oxygen Delivery: 2 LPM  Weight: 163 lbs 9.6 oz  General: Sitting up, awake, alert, pleasant, NAD  HEENT: AT/NT, EOMI, anicteric sclerae, MMM  Cardiovascular: RRR, no murmurs  Pulm: CTAB, breathing comfortably on room air, no wheezes or crackles  Abdomen: Soft, non-distended, non-tender, +BS, no masses  Extremities: WWP, no pitting edema in lower extremities bilaterally  Skin: Warm, dry, no rashes or bruising  Neuro: A&Ox3, CNII-XII grossly intact, moves all extremities, no focal deficits noted      Primary Care Physician   Kerri Echavarria    Discharge Orders      Discharge Instructions    Resume pre procedure diet     Discharge Instructions    Restart home medications.     Reason for your hospital stay    You were admitted for observation after your procedure as you were still having abdominal pain, nausea, and vomiting, which have improved.     Activity    Your activity upon discharge: activity as tolerated     Discharge Instructions    Avoid NSAIDs (such as ibuprofen) until you follow up with GI and are given ok to take these.     Follow Up and recommended labs and tests    Follow up with your Primary Care Provider in 7  "days for a post-discharge follow-up.    Dr. Gamez's ([facility]) office at [facility] will call and set-up follow-up with you if it is needed. If you have not heard within 1-2 days, please call [phone] if you haven't heard regarding these appointments     When to contact your care team    Please seek medical attention if you develop fevers, chills, weakness, chest pain, difficulty breathing, abdominal pain, nausea or vomiting, diarrhea, constipation, or inability to tolerate eating or drinking, or any other concerning symptoms.     Diet    Follow this diet upon discharge: Regular Diet       Significant Results and Procedures   8/27 EUS/ERCP  Impression:          - Major papilla leading to intraduodenal segment inside                        a small duodenal diverticulum.                        - Pancreatogram normal.                        - Prophylactic \"fallout\" plastic pancreatic duct stent                        deployed (traspapillary).                        - Cholangiogram normal.                        - Biliary endoscopic sphincterotomy performed, near                        total inside diverticulum                        . Small amounts of biliary cholesterolosis appreciated.                        - Plastic single-pigtail stent deployed into CBD                        (transpapillary).                        - Overall impression is essentially normal ERCP;                        however, the major papilla was very tight, suggesting                        that patient may have papillary stenosis as the etiology                        of intermittent liver enzyme elevations and as the cause                        of idiopathic acute pancreatitis. Some biliary                        cholesterolosis as appreciated, suggesting also that                        patient may have microlithiasis as etiology.     Discharge Medications   Discharge Medication List as of 8/28/2020 11:40 AM      START taking these " medications    Details   prochlorperazine (COMPAZINE) 10 MG tablet Take 1 tablet (10 mg) by mouth every 6 hours as needed for nausea or vomiting, Disp-10 tablet,R-0, E-Prescribe         CONTINUE these medications which have NOT CHANGED    Details   Acetaminophen (TYLENOL PO) Take  by mouth. prn, Oral, Until Discontinued, Historical      estradiol (CLIMARA) 0.05 MG/24HR weekly patch Place 1 patch onto the skin, Historical      omeprazole (PRILOSEC) 40 MG capsule Take 1 capsule by mouth daily. Please take one tab 30 min before breakfast., 40 mg, Oral, DAILY Starting 8/20/2012, Until Discontinued, Disp-90 capsule, R-3, Fax      ranitidine (ZANTAC) 150 MG tablet Take 1 tablet by mouth daily., 150 mg, Oral, DAILY Starting 8/20/2012, Until Discontinued, Disp-90 tablet, R-3, Fax         STOP taking these medications       ibuprofen (ADVIL/MOTRIN) 800 MG tablet Comments:   Reason for Stopping:             Allergies   Allergies   Allergen Reactions     Morphine GI Disturbance

## 2020-08-28 NOTE — PLAN OF CARE
Observation goals:     Pending stable vitals: Yes, VSS 98% on room air.     Pending established medical plan: Yes, Pt medical team signed for discharge, pt feels ready to discharge, tolerating fluids, voiding well, denies pain.       Pending stable dispo plan: Yes,  to transport home to [facility]    Pt verbalized understanding of discharge instructions, signed. PIV removed. Pt will  discharge medications in discharge pharmacy. Pt has all personal belongings.

## 2020-08-28 NOTE — PROGRESS NOTES
"PRIMARY DIAGNOSIS: \"GENERIC\" NURSING  OUTPATIENT/OBSERVATION GOALS TO BE MET BEFORE DISCHARGE:  1. ADLs back to baseline: No    2. Activity and level of assistance: Up with standby assistance.    3. Pain status: Pain free.    4. Return to near baseline physical activity: Yes     Discharge Planner Nurse   Safe discharge environment identified: Yes  Barriers to discharge: No       Entered by: Azucena Wilburn 08/27/2020 8:40 PM     Please review provider order for any additional goals.   Nurse to notify provider when observation goals have been met and patient is ready for discharge.  Observation goals  PER UNIT ROUTINE      Comments: - Pending stable vitals   - Pending established medical plan   - Pending stable dispo plan      Pt is still slightly nauseated with motion.  Otherwise, moves slowly and is sleepy.  Denied pain.  Abdominal site clean, dry/intact.  "

## 2020-08-28 NOTE — PROGRESS NOTES
Admission skin double check completed by Juliet Arguelles RN and Azucena Wilburn RN.  All skin intact except for ERCP site - right lower abdomen - small incision.

## 2020-08-30 ENCOUNTER — PATIENT OUTREACH (OUTPATIENT)
Dept: CARE COORDINATION | Facility: CLINIC | Age: 55
End: 2020-08-30

## 2020-08-31 NOTE — PROGRESS NOTES
[facility]: Post-Discharge Note  SITUATION                                                      Admission:    Admission Date: 08/27/20   Reason for Admission: Acute abdominal pain, nausea, and vomiting s/p EUS/ERCP with pancreatobiliary stenting 8/27  Discharge:   Discharge Date: 08/28/20  Discharge Diagnosis: Acute abdominal pain, nausea, and vomiting s/p EUS/ERCP with pancreatobiliary stenting 8/27    BACKGROUND                                                      Lesley Hyde was admitted on 8/27/2020. 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 She underwent EUS/ERCP with pancreatobiliary stenting 8/27 which was complicated by post procedural pain, nausea, and vomiting, admitted for observation and pain control overnight. The following morning patient's pain had improved and she was tolerating PO. The following problems were addressed during her hospitalization    ASSESSMENT      Discharge Assessment  Patient reports symptoms are: Improved  Does the patient have all of their medications?: Yes  Does patient know what their new medications are for?: Yes  Does patient have a follow-up appointment scheduled?: Yes  Does patient have any other questions or concerns?: No    Post-op  Did the patient have surgery or a procedure: Yes  Incision: healing  Drainage: No  Bleeding: none  Fever: No  Chills: No  Redness: No  Swelling: No  Incision site pain: No  Eating & Drinking: eating and drinking without complaints/concerns  PO Intake: regular diet  Bowel Function: normal  Urinary Status: voiding without complaint/concerns        PLAN                                                      Outpatient Plan:  Follow-up Appointments     Follow Up and recommended labs and tests      Follow up with your Primary Care Provider in 7  days for a post-discharge   follow-up.     Dr. Gamez's ([facility]) office at [facility] will call and set-up follow-up   with you if it is needed. If you have not heard within 1-2 days, please   call [phone] if you haven't heard regarding these appointments       No future appointments.        Brittaney Franklin, CMA

## 2020-09-03 ENCOUNTER — PATIENT OUTREACH (OUTPATIENT)
Dept: GASTROENTEROLOGY | Facility: CLINIC | Age: 55
End: 2020-09-03

## 2020-09-03 DIAGNOSIS — Z46.89 ENCOUNTER FOR REMOVAL OF PANCREATIC STENT: Primary | ICD-10-CM

## 2020-09-03 NOTE — TELEPHONE ENCOUNTER
Post ERCP (8/27/2020) with Dr. Gamez: Follow-up    Post procedure recommendations:   Order KUB in 1-month to ensure spontaneous passage of                        both biliary and pancreatic stent. If retained stents,                        schedule for EGD with stent extraction with Dr. Gamez                        in Endoscopy Unit.                        - Follow-up in virtual clinic with Dr. Gamez.     Orders placed: KUB, due 9/24- clinic scheduled in 6 weeks    Patient states left message    Clinic contact and scheduling numbers verified for future questions/concerns.    Denae Kingston RN Care Coordinator

## 2020-09-08 ENCOUNTER — TELEPHONE (OUTPATIENT)
Dept: GASTROENTEROLOGY | Facility: CLINIC | Age: 55
End: 2020-09-08

## 2020-09-08 NOTE — TELEPHONE ENCOUNTER
Records received 09/08/20 @ 1:38PM    Facility  [facility]  [address] [place] [address]  Ph. [phone]   Outcome Disc received via [employer]   6/12/2020 CT Abdomen and 4/13/220 MR Abdomen images received and sent to [facility] station to  Upload to PACS. No reports came with disc, notified RN about disc

## 2020-10-06 ENCOUNTER — PATIENT OUTREACH (OUTPATIENT)
Dept: GASTROENTEROLOGY | Facility: CLINIC | Age: 55
End: 2020-10-06

## 2020-10-06 NOTE — TELEPHONE ENCOUNTER
Called patient to inquire about xray, had done on 10-1, read in [facility]. Reminded of virtual clinic on 10-14.     Called to obtain copy of image.   [facility]    [address], [place] [id number] [phone]      Will push to [employer].

## 2020-10-08 ENCOUNTER — PATIENT OUTREACH (OUTPATIENT)
Dept: GASTROENTEROLOGY | Facility: CLINIC | Age: 55
End: 2020-10-08

## 2020-10-14 ENCOUNTER — VIRTUAL VISIT (OUTPATIENT)
Dept: GASTROENTEROLOGY | Facility: CLINIC | Age: 55
End: 2020-10-14
Payer: COMMERCIAL

## 2020-10-14 VITALS — HEIGHT: 63 IN | WEIGHT: 157 LBS | BODY MASS INDEX: 27.82 KG/M2

## 2020-10-14 DIAGNOSIS — K85.00 IDIOPATHIC ACUTE PANCREATITIS WITHOUT INFECTION OR NECROSIS: Primary | ICD-10-CM

## 2020-10-14 PROCEDURE — 99214 OFFICE O/P EST MOD 30 MIN: CPT | Mod: 95 | Performed by: INTERNAL MEDICINE

## 2020-10-14 ASSESSMENT — PAIN SCALES - GENERAL: PAINLEVEL: MODERATE PAIN (5)

## 2020-10-14 ASSESSMENT — MIFFLIN-ST. JEOR: SCORE: 1286.28

## 2020-10-14 NOTE — LETTER
"    10/14/2020         RE: Lesley Hyde  [id number] [facility] [id number]        Dear Colleague,    Thank you for referring your patient, Lesley Hyde, to the [facility]. Please see a copy of my visit note below.    Lesley Hyde is a 55 year old female who is being evaluated via a billable video visit.      The patient has been notified of following:     \"This video visit will be conducted via a call between you and your physician/provider. We have found that certain health care needs can be provided without the need for an in-person physical exam.  This service lets us provide the care you need with a video conversation.  If a prescription is necessary we can send it directly to your pharmacy.  If lab work is needed we can place an order for that and you can then stop by our lab to have the test done at a later time.    Video visits are billed at different rates depending on your insurance coverage.  Please reach out to your insurance provider with any questions.    If during the course of the call the physician/provider feels a video visit is not appropriate, you will not be charged for this service.\"    Patient has given verbal consent for Video visit? Yes  How would you like to obtain your AVS? MyChart  If you are dropped from the video visit, the video invite should be resent to: Send to e-mail at: [email or web address]  Will anyone else be joining your video visit? No      During this virtual visit the patient is located in [place], patient verifies this as the location during the entirety of this visit.       Video-Visit Details    Type of service:  Video Visit    VideoStart:   10/14/2020 03:00 pm   Stop:    10/14/2020 03:25 pm    Originating Location (pt. Location): Home    Distant Location (provider location):  [facility]     Platform used for Video Visit: Isaías    6 week post ERCP follow-up "   Empirical biliary sphincterotomy for intradiverticular stenotic papilla, in setting of idiopathic pancreatitis associated with significantly abnormal LFTs on one to possibly three occasions. Post cholecystectomy. Did well after the eRCP, discharged same day, passed stents the next. Xray confirmed passage of both biliary and pancreatic stents.  Was doing relatively well, but today complaining of burning sensation, not too different from reflux prior to LINX procedure, but lower, Not full blown pancreatic symptoms.     Lesley Merida has a complex functional abdominal pain background   with idiopathic gastroparesis, reflux disease post Lynx procedure, diverticulitis, and fibromyalgia.  She is on no specific antidepressants as she has had very adverse reactions to those and in fact is really only on a transdermal estrogen patch.  She did undergo a cholecystectomy in 2017 for a decreased gallbladder ejection fraction but during that episode care everywhere documents significant rises in her liver chemistries as below to the right.  She also had slight elevation in her serum lipase at that time.  Third column from the left shows a recent episode managed as an outpatient with abdominal pain, a lipase of 488 with AST 80 ALT 93 alk phos 147 which then all normalized, MRCP interpreted as normal and CT showing fat stranding around the body and tail of the pancreas consistent with interstitial pancreatitis.  That episode was associated with upper left abdominal pain that was worse than her baseline functional pains    Today quite tearful in that she is concerned that she may be having more of her complex somatic symptoms,  Possibly another attack of pancreatitis.  We discussed that it is possible that she is having another attack of pancreatitis as the procedure of biliary sphincterotomy while hopeful given that she had dramatically abnormal liver enzymes with pancreatitis, does not guarantee that it will prevent future attacks.   It is quite unclear whether her symptoms today are typical somatic symptoms or an evolving attack of pancreatitis.  We agreed that if things get any worse that she will go to urgent care or another facility and have bloods checked for a lipase amylase.  She may benefit from some IV hydration as well.  I suggested that she follow-up with Kerri Echavarria her primary NP and let us know the outcome.  Also discussed that there is unlikely to be another procedure to help her, more likely it would be trials of various medications.  Wondering if ursodiol, or sucralfate if it is bile reflux or other medicines might help.  Unfortunately there is no medicine likely to or proven to prevent recurrent idiopathic pancreatitis refractory to any specific intervention.  We will follow-up with her in several weeks after it is clear how she is doing    Total time 25 minutes more than 50% counseling       Ramesh Gamez MD          Called to check on  Patient after tearful visit yesterday.       Again, thank you for allowing me to participate in the care of your patient.        Sincerely,        Ramesh Gamez MD

## 2020-10-14 NOTE — NURSING NOTE
"Chief Complaint   Patient presents with     RECHECK     Virtual follow up       Vitals:    10/14/20 1332   Weight: 71.2 kg (157 lb)   Height: 1.6 m (5' 3\")       Body mass index is 27.81 kg/m .      PARISA Salazar NREMT                      "

## 2020-10-14 NOTE — PROGRESS NOTES
"Lesley Hyde is a 55 year old female who is being evaluated via a billable video visit.      The patient has been notified of following:     \"This video visit will be conducted via a call between you and your physician/provider. We have found that certain health care needs can be provided without the need for an in-person physical exam.  This service lets us provide the care you need with a video conversation.  If a prescription is necessary we can send it directly to your pharmacy.  If lab work is needed we can place an order for that and you can then stop by our lab to have the test done at a later time.    Video visits are billed at different rates depending on your insurance coverage.  Please reach out to your insurance provider with any questions.    If during the course of the call the physician/provider feels a video visit is not appropriate, you will not be charged for this service.\"    Patient has given verbal consent for Video visit? Yes  How would you like to obtain your AVS? MyChart  If you are dropped from the video visit, the video invite should be resent to: Send to e-mail at: [email or web address]  Will anyone else be joining your video visit? No      During this virtual visit the patient is located in [place], patient verifies this as the location during the entirety of this visit.       Video-Visit Details    Type of service:  Video Visit    VideoStart:   10/14/2020 03:00 pm   Stop:    10/14/2020 03:25 pm    Originating Location (pt. 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 Post cholecystectomy. Did well after the eRCP, discharged same day, passed stents the next. " Xray confirmed passage of both biliary and pancreatic stents.  Was doing relatively well, but today complaining of burning sensation, not too different from reflux prior to LINX procedure, but lower, Not full blown pancreatic symptoms.     Lesley Merida has a complex functional abdominal pain background   with idiopathic gastroparesis, reflux disease post Lynx procedure, diverticulitis, and fibromyalgia.  She is on no specific antidepressants as she has had very adverse reactions to those and in fact is really only on a transdermal estrogen patch.  She did undergo a cholecystectomy in 2017 for a decreased gallbladder ejection fraction but during that episode care everywhere documents significant rises in her liver chemistries as below to the right.  She also had slight elevation in her serum lipase at that time.  Third column from the left shows a recent episode managed as an outpatient with abdominal pain, a lipase of 488 with AST 80 ALT 93 alk phos 147 which then all normalized, MRCP interpreted as normal and CT showing fat stranding around the body and tail of the pancreas consistent with interstitial pancreatitis.  That episode was associated with upper left abdominal pain that was worse than her baseline functional pains    Today quite tearful in that she is concerned that she may be having more of her complex somatic symptoms,  Possibly another attack of pancreatitis.  We discussed that it is possible that she is having another attack of pancreatitis as the procedure of biliary sphincterotomy while hopeful given that she had dramatically abnormal liver enzymes with pancreatitis, does not guarantee that it will prevent future attacks.  It is quite unclear whether her symptoms today are typical somatic symptoms or an evolving attack of pancreatitis.  We agreed that if things get any worse that she will go to urgent care or another facility and have bloods checked for a lipase amylase.  She may benefit from some IV  hydration as well.  I suggested that she follow-up with Kerri Echavarria her primary NP and let us know the outcome.  Also discussed that there is unlikely to be another procedure to help her, more likely it would be trials of various medications.  Wondering if ursodiol, or sucralfate if it is bile reflux or other medicines might help.  Unfortunately there is no medicine likely to or proven to prevent recurrent idiopathic pancreatitis refractory to any specific intervention.  We will follow-up with her in several weeks after it is clear how she is doing    Total time 25 minutes more than 50% counseling       Ramesh Gamez MD

## 2020-10-15 NOTE — PATIENT INSTRUCTIONS
Follow up:    Dr. Gamez has outlined the following steps after your recent clinic visit:    Follow up with PCP and Urgent care as needed.          Please call with any questions or concerns regarding your clinic visit today.    It is a pleasure being involved in your health care.    Contacts post-consultation depending on your need:    Schedule Clinic Appointments            [phone] # 1   M-F 7:30 - 5 pm    Denae Kingston RN Care Coordinator  [phone]     OR Procedure Scheduling                             [phone]    My Chart is available 24 hours a day and is a secure way to access your records and communicate with your care team.  I strongly recommend signing up if you haven't already done so, if you are comfortable with computers.  If you would like to inquire about this or are having problems with My Chart access, you may call [phone] or go online at [email or web address].  Please allow at least 24 hours for a response and extra time on weekends and Holidays.

## 2020-11-18 ENCOUNTER — VIRTUAL VISIT (OUTPATIENT)
Dept: GASTROENTEROLOGY | Facility: CLINIC | Age: 55
End: 2020-11-18
Payer: COMMERCIAL

## 2020-11-18 VITALS — HEIGHT: 63 IN | WEIGHT: 155 LBS | BODY MASS INDEX: 27.46 KG/M2

## 2020-11-18 DIAGNOSIS — K85.00 IDIOPATHIC ACUTE PANCREATITIS WITHOUT INFECTION OR NECROSIS: Primary | ICD-10-CM

## 2020-11-18 PROCEDURE — 99212 OFFICE O/P EST SF 10 MIN: CPT | Mod: 95 | Performed by: INTERNAL MEDICINE

## 2020-11-18 ASSESSMENT — PAIN SCALES - GENERAL: PAINLEVEL: NO PAIN (0)

## 2020-11-18 ASSESSMENT — MIFFLIN-ST. JEOR: SCORE: 1277.21

## 2020-11-18 NOTE — PROGRESS NOTES
"The patient has been notified of following:      \"This video visit will be conducted via a call between you and your physician/provider. We have found that certain health care needs can be provided without the need for an in-person physical exam.  This service lets us provide the care you need with a video conversation.  If a prescription is necessary we can send it directly to your pharmacy.  If lab work is needed we can place an order for that and you can then stop by our lab to have the test done at a later time.     Video visits are billed at different rates depending on your insurance coverage.  Please reach out to your insurance provider with any questions.     If during the course of the call the physician/provider feels a video visit is not appropriate, you will not be charged for this service.\"     Patient has given verbal consent for Video visit? Yes  How would you like to obtain your AVS? MyChart  If you are dropped from the video visit, the video invite should be resent to: Text to cell phone   Will anyone else be joining your video visit? 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  Overall she is doing fairly well she thinks better than before the procedure.  It is in a complex background of multiple somatic issues, gastrointestinal reflux etc.  She has had no specific flares of pain but it does feel that some of her upper abdominal pain symptoms have improved.    We spent 10 minutes more than 50% video counseling discussing that in the setting of multiple somatic and motility issues her hope from the procedure was to prevent acute flares with obstructive features that she was having.  " Hopefully that we will continue to be the case.  I did suggest that some of her ongoing symptoms might be related to bile reflux.  Possible treatments for that would include sucralfate or ursodiol.    I suggested that she may want to follow-up with Dr. Troy in [place] if she has further flares of GI issues and bring up the possibility of those 2 medicines if he agrees.    Otherwise we will plan follow-up on an as-needed basis and with virtual visits very short notice is sufficient    I would like to thank Dr. Troy for referring this very pleasant patient

## 2020-11-18 NOTE — LETTER
"  11/18/2020      RE: Lesley Hyde  [id number] [facility] [address]      Dear Colleague,    Thank you for referring your patient, Lesley Hyde, to the [facility] [place]. Please see a copy of my visit note below.    The patient has been notified of following:      \"This video visit will be conducted via a call between you and your physician/provider. We have found that certain health care needs can be provided without the need for an in-person physical exam.  This service lets us provide the care you need with a video conversation.  If a prescription is necessary we can send it directly to your pharmacy.  If lab work is needed we can place an order for that and you can then stop by our lab to have the test done at a later time.     Video visits are billed at different rates depending on your insurance coverage.  Please reach out to your insurance provider with any questions.     If during the course of the call the physician/provider feels a video visit is not appropriate, you will not be charged for this service.\"     Patient has given verbal consent for Video visit? Yes  How would you like to obtain your AVS? MyChart  If you are dropped from the video visit, the video invite should be resent to: Text to cell phone   Will anyone else be joining your video visit? 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  Overall she is doing fairly well she thinks better than before the procedure.  It is in a complex background of multiple somatic issues, gastrointestinal reflux etc.  She has had no specific flares of pain but it does feel that some of her upper " abdominal pain symptoms have improved.    We spent 10 minutes more than 50% video counseling discussing that in the setting of multiple somatic and motility issues her hope from the procedure was to prevent acute flares with obstructive features that she was having.  Hopefully that we will continue to be the case.  I did suggest that some of her ongoing symptoms might be related to bile reflux.  Possible treatments for that would include sucralfate or ursodiol.    I suggested that she may want to follow-up with Dr. Troy in [place] if she has further flares of GI issues and bring up the possibility of those 2 medicines if he agrees.    Otherwise we will plan follow-up on an as-needed basis and with virtual visits very short notice is sufficient    I would like to thank Dr. Troy for referring this very pleasant patient    Ramesh Gamez MD

## 2020-11-18 NOTE — NURSING NOTE
"Chief Complaint   Patient presents with     RECHECK     1 month follow up       Vitals:    11/18/20 1111   Weight: 70.3 kg (155 lb)   Height: 1.6 m (5' 3\")       Body mass index is 27.46 kg/m .      Willis Reina LPN                        "

## 2020-11-19 NOTE — PATIENT INSTRUCTIONS
Follow up:    Dr. Gamez has outlined the following steps after your recent clinic visit:    Follow up with Dr. Troy in [place] if she has further flares of GI issues if desired.     Otherwise follow up with our office as needed.    Please call with any questions or concerns regarding your clinic visit today.    It is a pleasure being involved in your health care.    Contacts post-consultation depending on your need:    Schedule Clinic Appointments            [phone] # 1   M-F 7:30 - 5 pm    Denae Kingston RN Care Coordinator  [phone]     OR Procedure Scheduling                             [phone]    My Chart is available 24 hours a day and is a secure way to access your records and communicate with your care team.  I strongly recommend signing up if you haven't already done so, if you are comfortable with computers.  If you would like to inquire about this or are having problems with My Chart access, you may call [phone] or go online at [email or web address].  Please allow at least 24 hours for a response and extra time on weekends and Holidays.

## 2020-12-14 ENCOUNTER — HEALTH MAINTENANCE LETTER (OUTPATIENT)
Age: 55
End: 2020-12-14

## 2021-02-26 ENCOUNTER — TRANSFERRED RECORDS (OUTPATIENT)
Dept: HEALTH INFORMATION MANAGEMENT | Facility: CLINIC | Age: 56
End: 2021-02-26

## 2021-03-04 ENCOUNTER — TRANSFERRED RECORDS (OUTPATIENT)
Dept: HEALTH INFORMATION MANAGEMENT | Facility: CLINIC | Age: 56
End: 2021-03-04

## 2021-03-09 ENCOUNTER — TRANSFERRED RECORDS (OUTPATIENT)
Dept: HEALTH INFORMATION MANAGEMENT | Facility: CLINIC | Age: 56
End: 2021-03-09

## 2021-04-18 ENCOUNTER — HEALTH MAINTENANCE LETTER (OUTPATIENT)
Age: 56
End: 2021-04-18

## 2021-08-05 ENCOUNTER — MEDICAL CORRESPONDENCE (OUTPATIENT)
Dept: HEALTH INFORMATION MANAGEMENT | Facility: CLINIC | Age: 56
End: 2021-08-05

## 2021-08-09 ENCOUNTER — TRANSCRIBE ORDERS (OUTPATIENT)
Dept: OTHER | Age: 56
End: 2021-08-09

## 2021-08-10 ENCOUNTER — PATIENT OUTREACH (OUTPATIENT)
Dept: GASTROENTEROLOGY | Facility: CLINIC | Age: 56
End: 2021-08-10

## 2021-08-10 NOTE — TELEPHONE ENCOUNTER
Prior patient of Dr. Gamez, per chart review he referred her back to Dr Troy locally w/ more issues. Pt requested referral from [facility], [facility] declining to see. Called pt to see what needs are at this time.     Called pt, she asked that I call back later.     ML

## 2021-08-11 NOTE — TELEPHONE ENCOUNTER
Discussed with patient. Has no needs to follow up with Dr. Gamez, wants to follow up with [facility] r/t other GI issues.    Will f/ up with [facility] r/t general GI issues, will call us with additional pancreatic needs.    ML

## 2021-10-02 ENCOUNTER — HEALTH MAINTENANCE LETTER (OUTPATIENT)
Age: 56
End: 2021-10-02

## 2022-01-22 ENCOUNTER — HEALTH MAINTENANCE LETTER (OUTPATIENT)
Age: 57
End: 2022-01-22

## 2022-05-14 ENCOUNTER — HEALTH MAINTENANCE LETTER (OUTPATIENT)
Age: 57
End: 2022-05-14

## 2022-09-03 ENCOUNTER — HEALTH MAINTENANCE LETTER (OUTPATIENT)
Age: 57
End: 2022-09-03

## 2023-01-15 ENCOUNTER — HEALTH MAINTENANCE LETTER (OUTPATIENT)
Age: 58
End: 2023-01-15

## 2023-06-03 ENCOUNTER — HEALTH MAINTENANCE LETTER (OUTPATIENT)
Age: 58
End: 2023-06-03

## 2024-07-06 ENCOUNTER — HEALTH MAINTENANCE LETTER (OUTPATIENT)
Age: 59
End: 2024-07-06

## (undated) DEVICE — GUIDEWIRE NOVAGOLD .018X260CM STR TIP M00552000

## (undated) DEVICE — SOL WATER IRRIG 1000ML BOTTLE 2F7114

## (undated) DEVICE — PACK ENDOSCOPY GI CUSTOM UMMC

## (undated) DEVICE — KIT ENDO FIRST STEP DISINFECTANT 200ML W/POUCH EP-4

## (undated) DEVICE — KIT CONNECTOR FOR OLYMPUS ENDOSCOPES DEFENDO 100310

## (undated) DEVICE — ENDO CAP AND TUBING STERILE FOR ENDOGATOR  100130

## (undated) DEVICE — ESU GROUND PAD ADULT W/CORD E7507

## (undated) DEVICE — ENDO TUBING CO2 SMARTCAP STERILE DISP 100145CO2EXT

## (undated) DEVICE — ENDO FUSION OMNI-TOME 21 FS-OMNI-21 G48675

## (undated) DEVICE — BALLOON 3FR MINI COMPRESSION G10324

## (undated) DEVICE — PAD CHUX UNDERPAD 23X24" 7136

## (undated) DEVICE — BIOPSY VALVE BIOSHIELD 00711135

## (undated) DEVICE — ENDO BITE BLOCK ADULT OMNI-BLOC

## (undated) DEVICE — SUCTION MANIFOLD DORNOCH ULTRA CART UL-CL500

## (undated) RX ORDER — ONDANSETRON 2 MG/ML
INJECTION INTRAMUSCULAR; INTRAVENOUS
Status: DISPENSED
Start: 2020-08-27

## (undated) RX ORDER — FENTANYL CITRATE 50 UG/ML
INJECTION, SOLUTION INTRAMUSCULAR; INTRAVENOUS
Status: DISPENSED
Start: 2020-08-27

## (undated) RX ORDER — HYDROMORPHONE HYDROCHLORIDE 1 MG/ML
INJECTION, SOLUTION INTRAMUSCULAR; INTRAVENOUS; SUBCUTANEOUS
Status: DISPENSED
Start: 2020-08-27

## (undated) RX ORDER — SIMETHICONE 40MG/0.6ML
SUSPENSION, DROPS(FINAL DOSAGE FORM)(ML) ORAL
Status: DISPENSED
Start: 2020-08-27

## (undated) RX ORDER — LIDOCAINE HYDROCHLORIDE 20 MG/ML
INJECTION, SOLUTION EPIDURAL; INFILTRATION; INTRACAUDAL; PERINEURAL
Status: DISPENSED
Start: 2020-08-27

## (undated) RX ORDER — IOPAMIDOL 510 MG/ML
INJECTION, SOLUTION INTRAVASCULAR
Status: DISPENSED
Start: 2020-08-27

## (undated) RX ORDER — DEXAMETHASONE SODIUM PHOSPHATE 4 MG/ML
INJECTION, SOLUTION INTRA-ARTICULAR; INTRALESIONAL; INTRAMUSCULAR; INTRAVENOUS; SOFT TISSUE
Status: DISPENSED
Start: 2020-08-27

## (undated) RX ORDER — SODIUM CHLORIDE, SODIUM LACTATE, POTASSIUM CHLORIDE, CALCIUM CHLORIDE 600; 310; 30; 20 MG/100ML; MG/100ML; MG/100ML; MG/100ML
INJECTION, SOLUTION INTRAVENOUS
Status: DISPENSED
Start: 2020-08-27

## (undated) RX ORDER — PROPOFOL 10 MG/ML
INJECTION, EMULSION INTRAVENOUS
Status: DISPENSED
Start: 2020-08-27